# Patient Record
Sex: FEMALE | Race: WHITE
[De-identification: names, ages, dates, MRNs, and addresses within clinical notes are randomized per-mention and may not be internally consistent; named-entity substitution may affect disease eponyms.]

---

## 2017-04-03 ENCOUNTER — HOSPITAL ENCOUNTER (INPATIENT)
Dept: HOSPITAL 36 - ER | Age: 72
LOS: 3 days | Discharge: NURSING, REHAB OR LONG-TERM CARE | DRG: 872 | End: 2017-04-06
Attending: INTERNAL MEDICINE | Admitting: INTERNAL MEDICINE
Payer: MEDICARE

## 2017-04-03 DIAGNOSIS — A41.9: Primary | ICD-10-CM

## 2017-04-03 DIAGNOSIS — R62.7: ICD-10-CM

## 2017-04-03 DIAGNOSIS — F33.1: ICD-10-CM

## 2017-04-03 DIAGNOSIS — F29: ICD-10-CM

## 2017-04-03 DIAGNOSIS — J44.0: ICD-10-CM

## 2017-04-03 DIAGNOSIS — M81.0: ICD-10-CM

## 2017-04-03 DIAGNOSIS — G30.9: ICD-10-CM

## 2017-04-03 DIAGNOSIS — J45.901: ICD-10-CM

## 2017-04-03 DIAGNOSIS — M19.90: ICD-10-CM

## 2017-04-03 DIAGNOSIS — F02.80: ICD-10-CM

## 2017-04-03 DIAGNOSIS — J20.9: ICD-10-CM

## 2017-04-03 DIAGNOSIS — J44.1: ICD-10-CM

## 2017-04-03 DIAGNOSIS — E87.1: ICD-10-CM

## 2017-04-03 DIAGNOSIS — E66.9: ICD-10-CM

## 2017-04-03 LAB
ALBUMIN/GLOB SERPL: 1.3 {RATIO} (ref 1–1.8)
ALP SERPL-CCNC: 95 U/L (ref 34–104)
ALT SERPL-CCNC: 12 U/L (ref 7–52)
ANION GAP SERPL CALC-SCNC: 7.9 MMOL/L (ref 7–16)
AST SERPL-CCNC: 21 U/L (ref 13–39)
BACTERIA #/AREA URNS HPF: (no result) /HPF
BASOPHILS NFR BLD AUTO: 0.3 % (ref 0–2)
BILIRUB SERPL-MCNC: 0.3 MG/DL (ref 0.3–1)
BILIRUB UR-MCNC: NEGATIVE MG/DL
BUN SERPL-MCNC: 17 MG/DL (ref 7–25)
BUN/CREAT SERPL: 21.3
CALCIUM SERPL-MCNC: 10.3 MG/DL (ref 8.6–10.3)
CHLORIDE SERPL-SCNC: 103 MEQ/L (ref 98–107)
CO2 SERPL-SCNC: 29.2 MEQ/L (ref 21–31)
COLOR UR: YELLOW
CREAT SERPL-MCNC: 0.8 MG/DL (ref 0.6–1.2)
EOSINOPHIL NFR BLD AUTO: 3.4 % (ref 0–5)
EPI CELLS URNS QL MICRO: (no result) /LPF
ERYTHROCYTE [DISTWIDTH] IN BLOOD BY AUTOMATED COUNT: 14.5 % (ref 11.5–20)
GLOBULIN SER-MCNC: 2.9 GM/DL
GLUCOSE SERPL-MCNC: 139 MG/DL (ref 70–105)
GLUCOSE UR STRIP-MCNC: NEGATIVE MG/DL
HCT VFR BLD CALC: 41.7 % (ref 35–45)
HGB BLD-MCNC: 14 GM/DL (ref 11.7–16.1)
INR PPP: 0.92 (ref 0.5–1.4)
KETONES UR STRIP-MCNC: NEGATIVE MG/DL
LYMPHOCYTES NFR BLD AUTO: 23.5 % (ref 20–50)
MCH RBC QN AUTO: 28.9 PG (ref 27–31)
MCHC RBC AUTO-ENTMCNC: 33.6 PG (ref 28–36)
MCV RBC AUTO: 86.1 FL (ref 81–100)
MONOCYTES NFR BLD AUTO: 7.7 % (ref 2–10)
NEUTROPHILS # BLD: 4.3 TH/CMM (ref 1.8–8)
NEUTROPHILS NFR BLD AUTO: 65.1 % (ref 40–80)
PH UR STRIP: 7.5 [PH]
PLATELET # BLD: 274 TH/CMM (ref 150–400)
PMV BLD AUTO: 7.4 FL
POTASSIUM SERPL-SCNC: 4.1 MEQ/L (ref 3.5–5.1)
PROT UR STRIP-MCNC: NEGATIVE MG/DL
PROTHROMBIN TIME: 9.6 SECONDS (ref 9.5–11.5)
RBC # BLD AUTO: 4.85 MIL/CMM (ref 3.8–5.2)
RBC # UR STRIP: NEGATIVE /UL
RBC #/AREA URNS HPF: (no result) /HPF (ref 0–5)
SODIUM SERPL-SCNC: 136 MEQ/L (ref 136–145)
UROBILINOGEN UR STRIP-ACNC: 0.2 E.U./DL (ref 0.2–1)
WBC # BLD AUTO: 6.5 TH/CMM (ref 4.8–10.8)
WBC #/AREA URNS HPF: (no result) /HPF (ref 0–5)

## 2017-04-03 PROCEDURE — Z7610: HCPCS

## 2017-04-03 RX ADMIN — ALBUTEROL SULFATE SCH MG: 2.5 SOLUTION RESPIRATORY (INHALATION) at 21:16

## 2017-04-03 RX ADMIN — METHYLPREDNISOLONE SODIUM SUCCINATE SCH MG: 40 INJECTION, POWDER, FOR SOLUTION INTRAMUSCULAR; INTRAVENOUS at 21:00

## 2017-04-03 RX ADMIN — DEXTROSE AND SODIUM CHLORIDE SCH MLS/HR: 5; .45 INJECTION, SOLUTION INTRAVENOUS at 21:26

## 2017-04-03 RX ADMIN — IPRATROPIUM BROMIDE SCH MG: 0.5 SOLUTION RESPIRATORY (INHALATION) at 21:16

## 2017-04-03 NOTE — DIAGNOSTIC IMAGING REPORT
CHEST X-RAY: AP view



INDICATION: Shortness of breath, wheezing



COMPARISON: None



FINDINGS: Exam is limited as patient's head obscures the upper

hemithorax. No focal consolidation or pleural effusions. Heart size

normal. Atherosclerosis is noted. Spinal scoliosis is noted with

degenerative changes of the spine noted.



IMPRESSION:



Limited exam as patient's head obscures the upper hemithorax. Otherwise

no focal consolidation identified.



Atherosclerotic vascular disease. 



Degenerative changes of the spine with scoliosis.

## 2017-04-03 NOTE — ADMIT CRITERIA FORM
Admit Criteria Forms





- Admit Criteria


Diagnosis: 





                                             ASTHMA





Clinical Indications for Admission to Inpatient Care





                                                          (Place 'X' for any 

and all applicable criteria):





Admission is indicated for ANY ONE of the following  (1)(2)(3)(4)(5):


[ ]I.        Absent or markedly diminished breath sounds (silent chest)


[ ]II.       Oxygen saturation < 92%                                   


[ ]III.      PaCO2 = / > 42 mm Hg (5.6 kPa)


[ ]IV.     Peak expiratory flow rate < 40% of predicted or personal best after 

treatment.


[ ]V.      Peak expiratory flow rate < 33% of predicted or personal before 

after treatment


[ ]VI.     Change in mental status


[ ]VII.     Ventilatory support required  


[ ]VIII.    PaO2 < 60 mm Hg (8.0 kPa)                              


[ ]IX.      Cyanosis                                                           

  


[ ]X.       Cardiac dysrhythmia (e.g., bradycardia) 


[ ]XI.      Hemodynamic instability


[ ]XII.     Radiographic evidence of complication requiring inpatient treatment 

(e.g., pneumonia, pneumothorax)


[X]XIII.    Inpatient admission required rather than observation care (also use 

Asthma: Observation Care guideline as appropriate) 


            because of ANY ONE of the following:


            [X]a)    Respiratory finding that is severe or persistent (eg, 

dyspnea, tachypnea, accessory muscle use)


            [ ]b)    Airflow measurements less than 60% of predicted or 

personal best that persist (e.g., over 24 hours) or worsen despite treatments


            [ ]c)    Supplemental oxygen or respiratory treatments for over 24 

hours that are performable only in acute inpatient setting


            [ ]d)    Other condition, treatment or monitoring requiring 

inpatient admission.











Extended stay beyond goal length of stay may be needed for (26)(27)(28):


[ ]a)   Severe respiratory failure (23) (29) (30)           


[ ]b)   Secondary causes and complications (25)


[ ]c)   Status asthmaticus         


[ ]d)   Chronic obstructive asthma       


[ ]e)   Older patients (29)            


[ ]f)    Slow resolution                 


[ ]g)   Clinically significant exacerbation of comorbidities (eg, osvaldo. heart 

failure, atrial fibrillation)











The original [facility] content created by [facility] [employer] has been revised. 


The portions of the content which have been revised are identified through the 

use of italic text or in bold, and [facility] [employer] [employer] 


has neither reviewed nor approved the modified material. All other unmodified 

content is copyright  [facility]





Please see references footnoted in the original [facility] edition 

2016








Admit Criteria Met?: Yes

## 2017-04-03 NOTE — ED PHYSICIAN CHART
Chief Complaint/HPI





- Patient Information


Date Seen:: 04/03/17


Time Seen:: 14:03


Chief Complaint:: wheezing


History of Present Illness:: 





71-year-old female with history of dementia, sent in from skilled nursing with 

acute, moderate, wheezing since this morning.  Has associated increased work of 

breathing and decreased oral intake.





**History limited patient has underlying dementia cannot provide history


**History provided by EMS and EMS run sheet


Allergies:: 


 Allergies











Allergy/AdvReac Type Severity Reaction Status Date / Time


 


No Known Allergies Allergy   Verified 12/27/16 19:37











Historian:: Patient


Review:: Nurse's Note Reviewed





Review of Systems





- Review of Systems


Other: Complete system review otherwise unremarkable except as noted in history 

of present illness.





Past Medical History





- Past Medical History


Past Medical History: Asthma/COPD, Dementia


Family History: None


Social History: Non Smoker, No Alcohol, No Drug Use, Care Facility


Surgical History: None


Psychiatricy History: None


Medication: Reviewed





Family Medical History





- Family Member


  ** Mother


History Unknown: Yes





Physical Exam





- Physical Examination


Other:: 





INITIAL VITAL SIGNS: Reviewed by me


GENERAL: Alert and interactive.  Slight respiratory distress


HEAD: Head is normocephalic and atraumatic


EYES: EOMI. PERRL. No scleral icterus. No conjunctival injection


ENT: Moist mucous membranes. 


NECK: Supple. No masses. Full range of motion


RESPIRATORY: Slightly tachypneic with bilateral expiratory wheezing.


CV: Regular rate and rhythm. No murmurs, rubs, or gallops


ABDOMEN: Soft, non-distended, non-tender. No guarding. No rebound. No masses. 


EXTREMITIES: No deformity. No cyanosis. No edema. 


SKIN: Warm and dry. No obvious rashes. 


NEUROLOGIC: Alert and oriented. Face is symmetric. Speech is normal. Moves all 

extremities equally. Motor and sensory distally intact. 





ED Septic Shock





- .


Is Septic Shock (SBP<90, OR Lactate>4 mmol\L) present?: No





Reassessment (Disposition)





- Reassessment


Reassessment:: 





Patient having acute asthma exacerbation.  Gave IV Solu-Medrol and breathing 

treatment.  Covered with the prophylactic antibiotics.  Also has failure to 

thrive.  Underlying dementia and makes it difficult to assess the patient 

completely.  Discussed case with admitting physician who will admit the patient 

for further workup and treatment.


Reassessment Condition:: Improved





- Diagnosis


Diagnosis:: 





Acute wheezing and shortness of breath due to asthma exacerbation


Acute failure to thrive





- Patient Disposition


Discharge/Transfer:: Acute Care w/in this hosp


Admitted to:: Med/Surg


Admitting Medical Physician:: Harshad Ugalde


Time:: 15:51


Condition at Disposition:: Stable

## 2017-04-04 RX ADMIN — METHYLPREDNISOLONE SODIUM SUCCINATE SCH MG: 40 INJECTION, POWDER, FOR SOLUTION INTRAMUSCULAR; INTRAVENOUS at 07:55

## 2017-04-04 RX ADMIN — ALBUTEROL SULFATE SCH MG: 2.5 SOLUTION RESPIRATORY (INHALATION) at 15:29

## 2017-04-04 RX ADMIN — DEXTROSE AND SODIUM CHLORIDE SCH MLS/HR: 5; .45 INJECTION, SOLUTION INTRAVENOUS at 09:33

## 2017-04-04 RX ADMIN — Medication SCH TAB: at 09:33

## 2017-04-04 RX ADMIN — IPRATROPIUM BROMIDE SCH MG: 0.5 SOLUTION RESPIRATORY (INHALATION) at 19:18

## 2017-04-04 RX ADMIN — ALBUTEROL SULFATE SCH MG: 2.5 SOLUTION RESPIRATORY (INHALATION) at 19:18

## 2017-04-04 RX ADMIN — IPRATROPIUM BROMIDE SCH MG: 0.5 SOLUTION RESPIRATORY (INHALATION) at 08:35

## 2017-04-04 RX ADMIN — IPRATROPIUM BROMIDE SCH MG: 0.5 SOLUTION RESPIRATORY (INHALATION) at 15:29

## 2017-04-04 RX ADMIN — ALBUTEROL SULFATE SCH MG: 2.5 SOLUTION RESPIRATORY (INHALATION) at 11:55

## 2017-04-04 RX ADMIN — IPRATROPIUM BROMIDE SCH MG: 0.5 SOLUTION RESPIRATORY (INHALATION) at 11:55

## 2017-04-04 RX ADMIN — ALBUTEROL SULFATE SCH MG: 2.5 SOLUTION RESPIRATORY (INHALATION) at 08:35

## 2017-04-04 NOTE — HISTORY & PHYSICAL
CHIEF COMPLAINT:  Wheezing and shortness of breath.



HISTORY OF PRESENT ILLNESS:  This is a 71-year-old  female with history

of osteoarthritis, osteoporosis, bilateral hearing deficit was admitted from

nursing facility secondary to wheezing for 1 day and increasing shortness of

breath, using her ____ muscle.  The patient was seen through the Emergency Room.



PAST MEDICAL HISTORY:  As mentioned in history present illness.



PAST SURGICAL HISTORY:  Denies surgeries in the past.



ALLERGIES:  No known drug allergies.



MEDICATIONS:  The patient is on Tylenol, ____, albuterol, Namenda, meclizine,

Zofran.



FAMILY HISTORY:  Noncontributory.



SOCIAL HISTORY:  Nonsmoker, nondrinker.  The patient lives in nursing home.  The

patient has 2 daughters.



REVIEW OF SYSTEMS:

GENERAL:  Complains of not feeling well.

HEENT:  No blurred vision.

LUNGS:  ____ COPD.  Chest x-ray was suggestive, history of asthma.

HEART:  No hypertension or coronary artery disease.

ABDOMEN:  No nausea, vomiting, abdominal pain.

GENITOURINARY:  The patient denies increased dysuria.

NEUROLOGIC:  No headaches or seizure.

PSYCHIATRIC:  As stated above.



PHYSICAL EXAMINATION:

VITAL SIGNS:  Blood pressure 120/76, respirations 18, pulse 78, ____.

GENERAL:  This is an elderly female, appears stated age, mildly obese.

NECK:  Supple.  No mass.

LUNGS:  Equal breath sounds, otherwise clear to auscultation.

HEART:  Regular rate and rhythm with systolic ejection murmur.

ABDOMEN:  Soft, nontender.

EXTREMITIES:  Positive excoriations.

NEUROLOGIC:  Limited.



LABORATORY DATA:  WBC was 6.5, hemoglobin 14, platelets 274.  INR 0.9.  Sodium

136, potassium ____, ____, creatinine 0.7, blood sugar 139.  UA is negative.



ASSESSMENT AND PLAN:  Acute asthma exacerbation, acute chronic obstructive

pulmonary disease exacerbation, acute bronchitis, bilateral hearing deficit,

osteoarthritis, osteoporosis, dementia, obesity.  We will continue the patient

on IV antibiotics and also continue oxygen and bronchodilator treatment.  I will

review chest x-ray.  We will send blood cultures as well as sputum ____ and C

and S.  We will continue and monitor the patient closely.





DD: 04/04/2017 15:29

DT: 04/04/2017 17:02

JOB# [id number]  [id number]

## 2017-04-04 NOTE — DIAGNOSTIC IMAGING REPORT
CHEST X-RAY: AP view



INDICATION: Shortness of breath



COMPARISON: Chest x-ray 4/3/2017



FINDINGS: Chronic lung changes are seen with no focal consolidation or

effusions. Heart size is normal. Tortuous senescent aorta is noted with

atherosclerosis. Degenerative changes of the spine are noted.



IMPRESSION:



Chronic lung changes with possible COPD. No focal consolidation

identified.



Tortuous senescent aorta with atherosclerotic vascular disease.

## 2017-04-05 LAB
AMMONIA BLD-SCNC: 37 UMOL/L (ref 16–53)
ANION GAP SERPL CALC-SCNC: 6.4 MMOL/L (ref 7–16)
BUN SERPL-MCNC: 25 MG/DL (ref 7–25)
BUN/CREAT SERPL: 41.7
CALCIUM SERPL-MCNC: 10.3 MG/DL (ref 8.6–10.3)
CHLORIDE SERPL-SCNC: 103 MEQ/L (ref 98–107)
CO2 SERPL-SCNC: 27 MEQ/L (ref 21–31)
CREAT SERPL-MCNC: 0.6 MG/DL (ref 0.6–1.2)
ERYTHROCYTE [DISTWIDTH] IN BLOOD BY AUTOMATED COUNT: 14.6 % (ref 11.5–20)
GLUCOSE SERPL-MCNC: 164 MG/DL (ref 70–105)
HCT VFR BLD CALC: 37.3 % (ref 35–45)
HGB BLD-MCNC: 13.1 GM/DL (ref 11.7–16.1)
MAGNESIUM SERPL-MCNC: 1.7 MG/DL (ref 1.9–2.7)
MCH RBC QN AUTO: 29.8 PG (ref 27–31)
MCHC RBC AUTO-ENTMCNC: 35.2 PG (ref 28–36)
MCV RBC AUTO: 84.6 FL (ref 81–100)
NEUTROPHILS NFR BLD AUTO: 92 % (ref 40–80)
NEUTS BAND NFR BLD: 4 % (ref 0–10)
PLAT MORPH BLD: NORMAL
PLATELET # BLD EST: ADEQUATE 10*3/UL
PLATELET # BLD: 261 TH/CMM (ref 150–400)
PMV BLD AUTO: 7.8 FL
POTASSIUM SERPL-SCNC: 4.4 MEQ/L (ref 3.5–5.1)
RBC # BLD AUTO: 4.41 MIL/CMM (ref 3.8–5.2)
RBC MORPH BLD: NORMAL
SODIUM SERPL-SCNC: 132 MEQ/L (ref 136–145)
TOTAL CELLS COUNTED BLD: 100
TSH SERPL-ACNC: 0.12 UIU/ML (ref 0.34–5.6)
WBC # BLD AUTO: 16.9 TH/CMM (ref 4.8–10.8)

## 2017-04-05 RX ADMIN — ALBUTEROL SULFATE SCH MG: 2.5 SOLUTION RESPIRATORY (INHALATION) at 09:11

## 2017-04-05 RX ADMIN — IPRATROPIUM BROMIDE SCH MG: 0.5 SOLUTION RESPIRATORY (INHALATION) at 14:47

## 2017-04-05 RX ADMIN — DEXTROSE AND SODIUM CHLORIDE SCH MLS/HR: 5; .45 INJECTION, SOLUTION INTRAVENOUS at 21:56

## 2017-04-05 RX ADMIN — Medication SCH TAB: at 08:31

## 2017-04-05 RX ADMIN — IPRATROPIUM BROMIDE SCH MG: 0.5 SOLUTION RESPIRATORY (INHALATION) at 19:48

## 2017-04-05 RX ADMIN — DEXTROSE AND SODIUM CHLORIDE SCH MLS/HR: 5; .45 INJECTION, SOLUTION INTRAVENOUS at 06:44

## 2017-04-05 RX ADMIN — ALBUTEROL SULFATE SCH MG: 2.5 SOLUTION RESPIRATORY (INHALATION) at 19:48

## 2017-04-05 RX ADMIN — IPRATROPIUM BROMIDE SCH MG: 0.5 SOLUTION RESPIRATORY (INHALATION) at 09:12

## 2017-04-05 RX ADMIN — ALBUTEROL SULFATE SCH MG: 2.5 SOLUTION RESPIRATORY (INHALATION) at 11:59

## 2017-04-05 RX ADMIN — ALBUTEROL SULFATE SCH MG: 2.5 SOLUTION RESPIRATORY (INHALATION) at 14:47

## 2017-04-05 RX ADMIN — IPRATROPIUM BROMIDE SCH MG: 0.5 SOLUTION RESPIRATORY (INHALATION) at 11:59

## 2017-04-05 NOTE — DIAGNOSTIC IMAGING REPORT
Portable chest x-ray



HISTORY: Pneumonia



Compared with prior exam of April 4, 2017, no focal pulmonary processes

are seen. Atherosclerotic calcification seen in the aorta. No hilar or

mediastinal abnormalities. Scoliosis and degenerative changes noted to

the spine.



IMPRESSION:

1. No acute focal pulmonary processes

2. Atherosclerotic vascular changes

## 2017-04-05 NOTE — INTERNAL MEDICINE PROG NOTE
Internal Medicine Subjective





- Subjective


Patient seen and examined:: with staff, chart reviewed


Patient is:: awake, verbal, interactive


Patient Complaints of:: congestion


Per staff patient is:: no episodes of fall, eating well





Internal Medicine Objective





- Results


Result Diagrams: 


 04/05/17 06:20





 04/05/17 06:20


Recent Labs: 


 Laboratory Last Values











WBC  16.9 Th/cmm (4.8-10.8)  H D 04/05/17  06:20    


 


RBC  4.41 Mil/cmm (3.80-5.20)   04/05/17  06:20    


 


Hgb  13.1 gm/dL (11.7-16.1)   04/05/17  06:20    


 


Hct  37.3 % (35.0-45.0)  D 04/05/17  06:20    


 


MCV  84.6 fl ()   04/05/17  06:20    


 


MCH  29.8 pg (27.0-31.0)   04/05/17  06:20    


 


MCHC Differential  35.2 pg (28.0-36.0)   04/05/17  06:20    


 


RDW  14.6 % (11.5-20.0)   04/05/17  06:20    


 


Plt Count  261 Th/cmm (150-400)   04/05/17  06:20    


 


MPV  7.8 fl  04/05/17  06:20    


 


Neutrophils %  65.1 % (40.0-80.0)   04/03/17  14:33    


 


Band Neutrophils %  4 % (0-10)   04/05/17  06:20    


 


Lymphocytes %  23.5 % (20.0-50.0)   04/03/17  14:33    


 


Monocytes %  7.7 % (2.0-10.0)   04/03/17  14:33    


 


Eosinophils %  3.4 % (0.0-5.0)   04/03/17  14:33    


 


Basophils %  0.3 % (0.0-2.0)   04/03/17  14:33    


 


Neutrophils (Manual)  92 % (40-80)  H  04/05/17  06:20    


 


Lymphocytes  3 % (20-50)  L  04/05/17  06:20    


 


Monocytes  1 % (2-10)  L  04/05/17  06:20    


 


Platelet Estimate  ADEQUATE  (NORMAL)   04/05/17  06:20    


 


Platelet Morphology  NORMAL  (NORMAL)   04/05/17  06:20    


 


RBC Morph Micro Appear  NORMAL  (NORMAL)   04/05/17  06:20    


 


PT  9.6 SECONDS (9.5-11.5)   04/03/17  14:33    


 


INR  0.92  (0.5-1.4)   04/03/17  14:33    


 


PTT (Actin FS)  20.7 SECONDS (26.0-38.0)  L  04/03/17  14:33    


 


Sodium  132 mEq/L (136-145)  L  04/05/17  06:20    


 


Potassium  4.4 mEq/L (3.5-5.1)   04/05/17  06:20    


 


Chloride  103 mEq/L ()   04/05/17  06:20    


 


Carbon Dioxide  27.0 mEq/L (21.0-31.0)   04/05/17  06:20    


 


Anion Gap  6.4  (7.0-16.0)  L  04/05/17  06:20    


 


BUN  25 mg/dL (7-25)   04/05/17  06:20    


 


Creatinine  0.6 mg/dL (0.6-1.2)   04/05/17  06:20    


 


Est GFR ( Amer)  TNP   04/05/17  06:20    


 


Est GFR (Non-Af Amer)  TNP   04/05/17  06:20    


 


BUN/Creatinine Ratio  41.7   04/05/17  06:20    


 


Glucose  164 mg/dL ()  H  04/05/17  06:20    


 


Whole Bld Lactic Acid  1.87 mmol/L (0.60-1.99)   04/03/17  14:33    


 


Calcium  10.3 mg/dL (8.6-10.3)   04/05/17  06:20    


 


Magnesium  1.7 mg/dL (1.9-2.7)  L  04/05/17  06:20    


 


Total Bilirubin  0.3 mg/dL (0.3-1.0)   04/03/17  14:33    


 


AST  21 U/L (13-39)   04/03/17  14:33    


 


ALT  12 U/L (7-52)   04/03/17  14:33    


 


Alkaline Phosphatase  95 U/L ()   04/03/17  14:33    


 


Ammonia  37 umol/L (16-53)   04/05/17  06:20    


 


Creatine Kinase  27 U/L ()  L  04/03/17  14:33    


 


B-Natriuretic Peptide  152.0 pg/mL (5.0-100.0)  H  04/05/17  06:20    


 


Total Protein  6.8 gm/dL (6.0-8.3)   04/03/17  14:33    


 


Albumin  3.9 gm/dL (3.7-5.3)   04/03/17  14:33    


 


Globulin  2.9 gm/dL  04/03/17  14:33    


 


Albumin/Globulin Ratio  1.3  (1.0-1.8)   04/03/17  14:33    


 


TSH  0.12 uIU/ml (0.34-5.60)  L  04/05/17  06:20    


 


Urine Source  CATH   04/03/17  14:25    


 


Urine Color  YELLOW   04/03/17  14:25    


 


Urine Clarity  CLEAR  (CLEAR)   04/03/17  14:25    


 


Urine pH  7.5   04/03/17  14:25    


 


Ur Specific Gravity  1.015  (1.005-1.030)   04/03/17  14:25    


 


Urine Protein  NEGATIVE mg/dL (NEGATIVE)   04/03/17  14:25    


 


Urine Glucose (UA)  NEGATIVE mg/dL (NEGATIVE)   04/03/17  14:25    


 


Urine Ketones  NEGATIVE mg/dL (NEGATIVE)   04/03/17  14:25    


 


Urine Blood  NEGATIVE  (NEGATIVE)   04/03/17  14:25    


 


Urine Nitrate  NEGATIVE  (NEGATIVE)   04/03/17  14:25    


 


Urine Bilirubin  NEGATIVE  (NEGATIVE)   04/03/17  14:25    


 


Urine Urobilinogen  0.2 E.U./dL (0.2 - 1.0)   04/03/17  14:25    


 


Ur Leukocyte Esterase  NEGATIVE  (NEGATIVE)   04/03/17  14:25    


 


Urine RBC  NONE SEEN /hpf (0-5)   04/03/17  14:25    


 


Urine WBC  0-2 /hpf (0-5)   04/03/17  14:25    


 


Ur Epithelial Cells  RARE /lpf (FEW)   04/03/17  14:25    


 


Urine Bacteria  FEW /hpf (NONE SEEN)   04/03/17  14:25    














- Physical Exam


Vitals and I&O: 


 Vital Signs











Temp  97.1 F   04/05/17 12:17


 


Pulse  73   04/05/17 14:47


 


Resp  18   04/05/17 14:47


 


BP  117/71   04/05/17 12:17


 


Pulse Ox  94   04/05/17 14:47








 Intake & Output











 04/04/17 04/05/17 04/05/17





 18:59 06:59 18:59


 


Intake Total 958.75 1150 


 


Balance 958.75 1150 


 


Intake:   


 


  Intake, IV Amount 958.75 1050 


 


    Cefepime 1 gm In Dextrose 50 50 





    5% 50 ml @ 100 mls/hr IV   





    Q12H [facility] Rx#:[id number]   


 


    D5-0.45NS 1,000 ml @ 75 908.75 1000 





    mls/hr IV .[id number] [facility] Rx   





    #:[id number]   


 


  Oral  100 


 


Other:   


 


  Stool Characteristics  Soft Soft











Active Medications: 


Current Medications





Acetaminophen (Tylenol)  650 mg PO Q4HR PRN


   PRN Reason: Pain or Fever >101


   Stop: 06/02/17 19:27


Al Hydrox/Mg Hydrox/Simethicone (Maalox)  30 ml PO Q4H PRN


   PRN Reason: GI DISTRESS


Albuterol Sulfate (Albuterol 2.5mg/3ml Neb Ud)  2.5 mg HHN QIDRT [facility]


   Stop: 06/03/17 10:59


   Last Admin: 04/05/17 14:47 Dose:  2.5 mg


Citalopram Hydrobromide (Celexa)  20 mg PO DAILY RAPHAEL


   PRN Reason: Protocol


   Stop: 06/03/17 08:59


   Last Admin: 04/05/17 08:31 Dose:  20 mg


Donepezil HCl (Aricept)  5 mg PO HS [facility]


   Stop: 06/02/17 20:59


   Last Admin: 04/04/17 21:26 Dose:  5 mg


Guaifenesin (Robitussin)  200 mg PO Q4HR PRN


   PRN Reason: Cough or Congestion


   Stop: 06/02/17 19:27


Heparin Sodium (Porcine) (Heparin)  5,000 units SUBQ Q12HR [facility]


   Stop: 06/02/17 20:59


   Last Admin: 04/05/17 08:30 Dose:  5,000 units


Cefepime HCl 1 gm/ Dextrose  50 mls @ 100 mls/hr IV Q12H [facility]


   Stop: 06/02/17 19:29


   Last Admin: 04/05/17 06:43 Dose:  100 mls/hr


Dextrose/Sodium Chloride (D5-0.45ns)  1,000 mls @ 75 mls/hr IV .[id number] [facility]


   Stop: 06/02/17 19:29


   Last Admin: 04/05/17 06:44 Dose:  75 mls/hr


Ipratropium Bromide (Atrovent Neb 0.5mg/2.5ml)  0.5 mg HHN QIDRT [facility]


   Stop: 06/03/17 10:59


   Last Admin: 04/05/17 14:47 Dose:  0.5 mg


Magnesium Hydroxide (Milk Of Magnesia)  30 ml PO DAILY PRN


   PRN Reason: Constipation


   Stop: 06/02/17 19:26


Meclizine HCl (Antivert)  25 mg PO DAILY PRN


   PRN Reason: Nausea / Vomiting


   Stop: 06/02/17 19:27


Memantine (Namenda)  5 mg PO DAILY [facility]


   Stop: 06/03/17 08:59


   Last Admin: 04/05/17 08:31 Dose:  5 mg


Ondansetron HCl (Zofran)  4 mg IV Q8H PRN


   PRN Reason: Nausea / Vomiting


   Stop: 06/02/17 19:27








General: alert


HEENT: NC/AT, PERRLA


Neck: Supple, No JVD


Lungs: congested, rales


Cardiovascular: RRR, Normal S1, Normal S2


Abdomen: soft non-tender, globular


Extremities: excoriation


Neurological: no change





Internal Medicine Assmt/Plan





- Assessment


Assessment: 





acute asthma exac 


acute bronchitis 


dementia 


obesity 


leukocytosis


hyponatremia





- Plan


Plan: 





cont on iv abx 


monitor wbc


o2 


bronchodilator 


dw rn 





Nutritional Asmnt/Malnutr-PDOC





- Dietary Evaluation


Malnutrition Findings (Please click <Entered> for more info): 








Nutritional Asmnt/Malnutrition                             Start:  04/04/17 13:

03


Text:                                                      Status: Complete    

  


Freq:                                                                          

  


 Document     04/04/17 13:03  GSUN  (Rec: 04/04/17 13:30  GSUN  [id number])


 Nutritional Asmnt/Malnutrition


     Patient General Information


      Nutritional Screening                      High Risk Screening


      Diagnosis                                  ER: acute asthma exacerbation,


                                                 acute failrue to thrive


      Pertinent Medical Hx/Surgical Hx           ER: dementia, asthma/COPD


      Subjective Information                     71year old female from SNF.


                                                 Visited pt with daughter at


                                                 bedside. Pt was able to answer


                                                 simple questiosn with


                                                 forgetfulness noted. Per ER


                                                 notes, pt dx FTT and noted


                                                 with decreased oral intake.


                                                 .7lb. Daughter reported


                                                 pt usually with good appetite


                                                 , possibly even gained weight


                                                 since adm to SNF, unsure of


                                                 CBW, aware that pt admitted


                                                 due to decreased oral. Re-


                                                 visited pt during lunch time,


                                                 observed more than half meal


                                                 untouched, asked pt, pt stated


                                                 "I'm done."


      Current Diet Order/ Nutrition Support      [facility] soft ground


      Pertinent Medications                      Maalox, D5, MOM, Solu-Medrol,


                                                 Zofran


      Pertinent Labs                             4/3: glucose 139H


     Nutritional Hx/Data


      Height                                     1.68 m


      Height (Calculated Centimeters)            167.6


      Current Weight (lbs)                       65.181 kg


      Weight (Calculated Kilograms)              65.2


      Weight (Calculated Grams)                  04896.2


      Ideal Body Weight                          130


      Weight Status                              Approriate


     GI Symptoms


      Usual diet at home                         Luis Montanez: [facility] soft diet


      Skin Integrity/Comment:                    Henry 15. Skin intact.


      Current %PO                                Poor (25-49%)


     Estimated Nutritional Goals


      BEE in Kcals:                              Using Current wt


      Calories/Kcals/Kg                          .7lb/65.3kg


      Kcals Calculated                           1959kcal (30kcal/kg)


      Protein:                                   Using Current wt


      Protein Calculated                         65-78g (1-1.2g/kg)


      Fluid: ml                                  1959ml (1ml/kcal)


     Nutritional Problem


      1. Problem


       Problem                                   Inadequate oral food beverage


                                                 intake related to


       Etiology                                  possibly cognition, possibly


                                                 lost of appetite, ER dx. FTT


                                                 aeb


       Signs/Symptoms:                           ER noted decreased PO at SNF,


                                                 observed lunch more than half


                                                 untouched


     Intervention/Recommendation


      Comments                                   1. Continue with [facility] soft


                                                 ground diet. Dx. FTT and poor


                                                 PO intake noted, will monitor.


     Expected Outcomes/Goals


      Expected Outcomes/Goals                    1. PO intake to meet at least


                                                 100% of nutritional needs, dx.


                                                 FTT.

## 2017-04-06 LAB
ERYTHROCYTE [DISTWIDTH] IN BLOOD BY AUTOMATED COUNT: 14.8 % (ref 11.5–20)
FOLATE SERPL-MCNC: 11.2 NG/ML (ref 3–?)
HCT VFR BLD CALC: 34.8 % (ref 35–45)
HGB BLD-MCNC: 11.9 GM/DL (ref 11.7–16.1)
MCH RBC QN AUTO: 29.5 PG (ref 27–31)
MCHC RBC AUTO-ENTMCNC: 34.2 PG (ref 28–36)
MCV RBC AUTO: 86.4 FL (ref 81–100)
NEUTROPHILS NFR BLD AUTO: 92 % (ref 40–80)
PLATELET # BLD EST: ADEQUATE 10*3/UL
PLATELET # BLD: 239 TH/CMM (ref 150–400)
PMV BLD AUTO: 7.1 FL
RBC # BLD AUTO: 4.03 MIL/CMM (ref 3.8–5.2)
RBC MORPH BLD: NORMAL
TOTAL CELLS COUNTED BLD: 100
VIT B12 SERPL-MCNC: 382 PG/ML (ref 211–946)
WBC # BLD AUTO: 11.9 TH/CMM (ref 4.8–10.8)

## 2017-04-06 RX ADMIN — Medication SCH TAB: at 08:46

## 2017-04-06 RX ADMIN — IPRATROPIUM BROMIDE SCH MG: 0.5 SOLUTION RESPIRATORY (INHALATION) at 15:11

## 2017-04-06 RX ADMIN — ALBUTEROL SULFATE SCH MG: 2.5 SOLUTION RESPIRATORY (INHALATION) at 10:44

## 2017-04-06 RX ADMIN — ALBUTEROL SULFATE SCH MG: 2.5 SOLUTION RESPIRATORY (INHALATION) at 15:11

## 2017-04-06 RX ADMIN — IPRATROPIUM BROMIDE SCH MG: 0.5 SOLUTION RESPIRATORY (INHALATION) at 07:05

## 2017-04-06 RX ADMIN — ALBUTEROL SULFATE SCH MG: 2.5 SOLUTION RESPIRATORY (INHALATION) at 07:05

## 2017-04-06 RX ADMIN — IPRATROPIUM BROMIDE SCH MG: 0.5 SOLUTION RESPIRATORY (INHALATION) at 10:44

## 2017-04-06 NOTE — CONSULTATION
The patient was seen, chart reviewed, and discussed with staff.



HISTORY OF PRESENT ILLNESS:  The patient is a 71-year-old female with a history

of depression with psychosis and dementia, Alzheimer's type, known to myself for

the past several years, currently admitted to the medical floor with underlying

infection with elevated white count of 16.9.  The  patient was able to recognize

me when I walked in the room today.  She said she is feeling tired and still

having some episodes where she feels anxious and depressed.  The patient has

been cooperative with staff.  The patient has not been agitated.  The patient is

taking her medications.



PAST PSYCHIATRIC HISTORY:  Multiple hospitalizations, chronic history of

depression.



PAST MEDICAL HISTORY:  As per Dr. Ugalde.



PSYCHOSOCIAL HISTORY:  The patient resides in [facility], and she requires

complete care.  Her daughter is usually involved and supportive.



MENTAL STATUS EXAMINATION:  The patient is in bed, oriented to person, knew she

was in the hospital, and she is oriented to approximate time.  Her speech is

fluent, not pressured.  Affect appears to be somewhat anxious and depressed.  No

auditory or visual hallucinations.  No suicidal thoughts, limited insight.



ASSESSMENT:  Major depressive disorder, recurrent, moderate dementia,

Alzheimer's type.



PLAN:  We will continue Celexa 20 mg daily.  Continue supportive measures. 

Increase Aricept to 10 mg p.o. at bedtime.  Use Ativan 0.5 mg p.o. b.i.d. p.r.n.

anxiety.  We will follow the patient while in the hospital.



Thank you for the consultation.





DD: 04/05/2017 15:47

DT: 04/06/2017 04:56

[facility]# [id number]  [id number]

## 2017-04-07 NOTE — PROGRESS NOTES
The patient was seen, discussed with staff, chart is reviewed.  Still forgetful

and confused though she was in hospital, thought she was 89 years old, the

patient is slightly guarded, her Zyprexa was held upon admission to minimize any

sedation.  The patient is still with sad facial expression and depressive

symptoms.  We will increase Namenda to 5 mg p.o. b.i.d.  We will continue Celexa

20 mg daily, restart Zyprexa at a smaller dose 2.5 mg p.o. at bedtime.  The

patient appears to be somewhat more paranoid and guarded.





DD: 04/06/2017 12:42

DT: 04/07/2017 04:12

[facility]# [id number]  [id number]

## 2017-06-11 NOTE — DISCHARGE SUMMARY
DATE OF DISCHARGE:  04/06/2017



CHIEF COMPLAINT:  Wheezing and shortness of breath.



FINAL DIAGNOSES:  Acute asthma exacerbation, acute chronic obstructive pulmonary

disease exacerbation, acute bronchitis ____ hearing deficit, osteoarthritis,

osteoporosis, dementia and obesity.



HISTORYOF PRESENT ILLNESS:  This is a 72-year-old  female with history

of osteoarthritis, osteoporosis, hearing deficit who was admitted from nursing

facility secondary to increased shortness of breath.  The patient was seen in

the Emergency Room and admitted for further management.



PHYSICAL EXAMINATION:

VITAL SIGNS:  Blood pressure 112/64, respirations 18, pulse 80, temperature

98.6.

GENERAL:  Elderly female, appears her stated age.

NECK:  Supple.

LUNGS:  Equal breath sounds, few rhonchi.

HEART:  Regular rate and rhythm, systolic ejection murmur.

ABDOMEN:  Soft, nontender.

EXTREMITIES:  Positive excoriations.



HOSPITAL COURSE:  The patient was admitted to medical floor and continued on IV

hydration and IV antibiotic ____ IV steroids.  The patient's condition did

improve.  X-ray did improve.  The patient was also referred to Psychiatry for

further adjustment of her psychotropic medication.



CONDITION ON DISCHARGE:  Fair.



DISCHARGE INSTRUCTIONS:  The patient is to continue on oral antibiotic regimen

as well as steroids in the nursing facility.





DD: 06/11/2017 16:38

DT: 06/11/2017 21:28

JOB# [id number]  [id number]

## 2017-08-13 ENCOUNTER — HOSPITAL ENCOUNTER (EMERGENCY)
Dept: HOSPITAL 36 - ER | Age: 72
Discharge: NURSING, REHAB OR LONG-TERM CARE | End: 2017-08-13
Payer: MEDICARE
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
## 2017-08-13 DIAGNOSIS — K21.9: ICD-10-CM

## 2017-08-13 DIAGNOSIS — J45.909: ICD-10-CM

## 2017-08-13 DIAGNOSIS — S01.81XA: Primary | ICD-10-CM
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
## 2017-08-13 DIAGNOSIS — S16.1XXA: ICD-10-CM
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
## 2017-08-13 DIAGNOSIS — J44.9: ICD-10-CM
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
## 2017-08-13 LAB
ALBUMIN/GLOB SERPL: 1.6 {RATIO} (ref 1–1.8)
ALP SERPL-CCNC: 116 U/L (ref 34–104)
ALT SERPL-CCNC: 17 U/L (ref 7–52)
ANION GAP SERPL CALC-SCNC: 7.8 MMOL/L (ref 7–16)
AST SERPL-CCNC: 23 U/L (ref 13–39)
BASOPHILS NFR BLD AUTO: 0.4 % (ref 0–2)
BILIRUB SERPL-MCNC: 0.4 MG/DL (ref 0.3–1)
BUN SERPL-MCNC: 23 MG/DL (ref 7–25)
BUN/CREAT SERPL: 38.3
CALCIUM SERPL-MCNC: 10.1 MG/DL (ref 8.6–10.3)
CHLORIDE SERPL-SCNC: 104 MEQ/L (ref 98–107)
CO2 SERPL-SCNC: 29.2 MEQ/L (ref 21–31)
CREAT SERPL-MCNC: 0.6 MG/DL (ref 0.6–1.2)
EOSINOPHIL NFR BLD AUTO: 1.8 % (ref 0–5)
ERYTHROCYTE [DISTWIDTH] IN BLOOD BY AUTOMATED COUNT: 13.5 % (ref 11.5–20)
GLOBULIN SER-MCNC: 2.6 GM/DL
GLUCOSE SERPL-MCNC: 111 MG/DL (ref 70–105)
HCT VFR BLD CALC: 41.7 % (ref 35–45)
HGB BLD-MCNC: 14.1 GM/DL (ref 11.7–16.1)
INR PPP: 0.93 (ref 0.5–1.4)
LYMPHOCYTES NFR BLD AUTO: 14.1 % (ref 20–50)
MCH RBC QN AUTO: 30.2 PG (ref 27–31)
MCHC RBC AUTO-ENTMCNC: 33.8 PG (ref 28–36)
MCV RBC AUTO: 89.3 FL (ref 81–100)
MONOCYTES NFR BLD AUTO: 7.7 % (ref 2–10)
NEUTROPHILS # BLD: 7.3 TH/CMM (ref 1.8–8)
NEUTROPHILS NFR BLD AUTO: 76 % (ref 40–80)
PLATELET # BLD: 276 TH/CMM (ref 150–400)
PMV BLD AUTO: 7.2 FL
POTASSIUM SERPL-SCNC: 4 MEQ/L (ref 3.5–5.1)
PROTHROMBIN TIME: 9.7 SECONDS (ref 9.5–11.5)
RBC # BLD AUTO: 4.66 MIL/CMM (ref 3.8–5.2)
SODIUM SERPL-SCNC: 137 MEQ/L (ref 136–145)
WBC # BLD AUTO: 9.6 TH/CMM (ref 4.8–10.8)

## 2017-08-13 PROCEDURE — Z7610: HCPCS

## 2017-08-13 NOTE — ED PHYSICIAN CHART
Chief Complaint/HPI





- Patient Information


Date Seen:: 08/13/17


Time Seen:: 08:45


Chief Complaint:: L forehead laceration.


History of Present Illness:: 





Brought in by ambulance from nursing facility because pt sustained the above 

mentioned injury when she fell at about 4 am today. Pt c/o L frontal headahce 

and posterior neck pain. Pt states that she was getting up to go to the 

bathroom. She slipped and fell. No LOC. Pt denies any other bodily injury or 

pain. Pt has dementia and is not fully cooperative; thus, H & P are limited. 

Info is primarily from review of transfer documents. Last tetanus immunization 

is unknown.


Allergies:: 


 Allergies











Allergy/AdvReac Type Severity Reaction Status Date / Time


 


No Known Allergies Allergy   Verified 12/27/16 19:37











Vitals:: 





see Nurse Note.


Historian:: Patient, Medical Records (from transferring facility.)


Family MD/PCP:: 





Dr. Blue


LMP:: 





Postmenopausal.


Review:: Nurse's Note Reviewed, Transfer documents Reviewed





Review of Systems





- Review of Systems


General/Constitutional: Other (Pt does not cooperate fully for ROS.)


Head: Headache


Cardio Vascular: No chest pain


Pulmonary: No SOB


Musculoskeletal: No back pain, Other (posterior neck pain.)





Past Medical History





- Past Medical History


Past Medical History: Asthma/COPD, PUD/GERD, Arthritis, Dementia (with Alzheimer

's Disease.), Other (Osteoporosis, chronic anemia)


Family History: Other (Pt does not cooperate to provide info on FHx.)


Social History: Care Facility, Other (Pt does not cooperate to )


Surgical History: other (Pt does not cooperate to provide info on Surgical Hx.)


Psychiatricy History: Depression, Dementia


Medication: Reviewed





Family Medical History





- Family Member


  ** Mother


History Unknown: Yes





Physical Exam





- Physical Examination


General/Constitutional: Awake, Well-developed, well-nourished, Alert, No 

distress, Non-toxic appearing


Other Gen/Cons comments:: 





Breathes comfortably, speaks clearly, but is not fully cooperative.


Other Head comments:: 





There is an approx. 2.5 cm transverse superficial laceration noticed at L 

forehead. No skull deformity or bony tapering detected. No active bleeding.


Eyes: Lids, conjuctiva normal, PERRL, EOMI


Skin: No rash, No ecchymosis, Well hydrated, No lymphadenopathy


Other Skin comments:: 





see also Head exam.


ENMT: External ears, nose nl, TM canals nl, Nasal exam nl, Oropharynx nl


Other ENMT comments:: 





No hemoptympanus


Neck: No JVD, No nuchal rigidity, No mass, No stridor


Other Neck comments:: 





Mild tenderness at mid posterior aspect. Neck is supple. No gross deformity, 

erythema, ecchymosis or open wound.


Respiratory: Nl effort/Exclusion, Clear to Auscultation, No Wheeze/Rhonchi/Rales


Cardio Vascular: RRR, No murmur, gallop, rubs


GI: No tenderness/rebounding/guarding, No organomegaly, No hernia, Normal BS's, 

Nondistended, No mass/bruits


Other GI comments:: 





There is a well healed midline longitudinal surgical scar at upper abdomen 

above the umbilicus.


: No CVA tenderness


Extremities: No edema


Other Extremities comments:: 





Both hips are nontender with good ROM. No leg length discrepancy. Pt can 

medially and laterally rotate both hips without difficulty.


Neuro/Psych: Alert/oriented (knows her name and that she is in an ER.), Mood 

normal


Other Neuro/Psych comments:: 





Spontaneous movements noticed in all 4 extremities. Pt does not cooperate for 

full neurological exam.


Misc: normal gait, Normal back, No paraspinal tenderness





Labs/Radiology/EKG Results





- Lab Results


Results: 





 Laboratory Tests











  08/13/17 08/13/17 08/13/17





  09:37 09:37 09:37


 


WBC  9.6  


 


RBC  4.66  


 


Hgb  14.1  D  


 


Hct  41.7  D  


 


MCV  89.3  


 


MCH  30.2  


 


MCHC Differential  33.8  


 


RDW  13.5  


 


Plt Count  276  


 


MPV  7.2  


 


Neutrophils %  76.0  


 


Lymphocytes %  14.1 L  


 


Monocytes %  7.7  


 


Eosinophils %  1.8  


 


Basophils %  0.4  


 


PT   9.7 


 


INR   0.93 


 


PTT (Actin FS)   25.6 L 


 


Sodium    137


 


Potassium    4.0


 


Chloride    104


 


Carbon Dioxide    29.2


 


Anion Gap    7.8


 


BUN    23


 


Creatinine    0.6


 


Est GFR ( Amer)    TNP


 


Est GFR (Non-Af Amer)    TNP


 


BUN/Creatinine Ratio    38.3


 


Glucose    111 H


 


Calcium    10.1


 


Total Bilirubin    0.4


 


AST    23


 


ALT    17


 


Alkaline Phosphatase    116 H


 


Creatine Kinase    39


 


Troponin I   


 


Total Protein    6.7


 


Albumin    4.1


 


Globulin    2.6


 


Albumin/Globulin Ratio    1.6














  08/13/17





  09:37


 


WBC 


 


RBC 


 


Hgb 


 


Hct 


 


MCV 


 


MCH 


 


MCHC Differential 


 


RDW 


 


Plt Count 


 


MPV 


 


Neutrophils % 


 


Lymphocytes % 


 


Monocytes % 


 


Eosinophils % 


 


Basophils % 


 


PT 


 


INR 


 


PTT (Actin FS) 


 


Sodium 


 


Potassium 


 


Chloride 


 


Carbon Dioxide 


 


Anion Gap 


 


BUN 


 


Creatinine 


 


Est GFR ( Amer) 


 


Est GFR (Non-Af Amer) 


 


BUN/Creatinine Ratio 


 


Glucose 


 


Calcium 


 


Total Bilirubin 


 


AST 


 


ALT 


 


Alkaline Phosphatase 


 


Creatine Kinase 


 


Troponin I  0.01


 


Total Protein 


 


Albumin 


 


Globulin 


 


Albumin/Globulin Ratio 














- Radiology Results


Results: 





CT head without contrast: Soft tissue injury of the left forehead. No evidence 

of an skull fracture. No evidence of an acute intracranial hemorrhage. Mild 

atrophy. Moderate supratentorial white matter disease which is nonspecific and 

may be due to chronic microvessel ischemia. Official report per Dr. Ronaldo Tristan

, radiologist.





CT cervical spine without contrast: No evidence of an acute fracture. Extensive 

multilevel degenerative changes. 3 mm posterior translation of the left lateral 

mass of C1 in relation to C2 which was also seen on prior exam and may be due 

to chronic degenerative etiology and/or positioning. 3 mm anterolisthesis of C3 

on C4 and C7 on T1 likely due to underlying facet arthropathy. Atherosclerotic 

vascular disease. Heterogeneous thyroid gland. Ultrasound would further 

clarify. Official report per Dr. Ronaldo Tristan, radiologist.





- EKG Interpretations


EKG Time:: 09:10


Rate & Rhythm: NSR with VR 68


Comments:: 





NSSTT changes.





Repeat EKG at 1309 NSR with VR 62. NSSTT changes.





ED Septic Shock





- .


Is Septic Shock (SBP<90, OR Lactate>4 mmol\L) present?: No





Reassessment (Disposition)





- Reassessment


Reassessment:: 





1125 Pt has been repeatedly evaluated. Pt overall feels better without 

subjective complaint. Awaiting head CT report.





     Laceration repair: L forehead laceration approx. 2.5 cm in length. 


                             Pt was preped in usual sterile manner. Wound was 

cleansed with betadine, then it was well irrigated with sterile normal saline. 

Wound was explored. No foreign body was found. Wound was well approximated with 

Dermabond. Pt tolerated the procedure without immediate complication.





1320 Pt has been repeatedly evaluated. Pt remains stable. No new findings. EKG, 

lab, and CT findings have been reviewed with pt. Management plan has been 

discussed. Dr. Blue is to be contacted.





1440 Pt remains stable. No headache or neck pain. No chest pain or discomfort. 

No dyspnea. Case was discussed with Dr. Blue with pertinent H & P, EKG's, CT 

and lab findings reviewed. He decided that pt is to be transferred back to 

nursing care facility. He will resume care of pt there. Regarding the 

incidental findings on thyroid gland on cervical spine CT, he will make sure 

that pt to have outpatient work up and thyroid sonogram to be done as 

outpatient.





1448 Pt remains comfortable. Pt requests to be sent back to nursing facility 

now. Aftercare instructions have been given.


Reassessment Condition:: Improved





- Diagnosis


Diagnosis:: 





Mechanical fall with cervical strain and  L forehead laceration, s/p repair 

with Dermabond.


Incidental findings of heterogenous thyroid gland on cervical CT, pending 

further outpatient work up per Dr. Blue.


Dementia.











- Aftercare/Follow up Instructions


Aftercare/Follow-Up Instructions:: Refer to Discharge Instructions


Notes:: 





Wear C-collar as directed.


Keep L forehead wound clean and dry.


May take Tylenol 500 mg tab one tab po q6h prn pain.


Head Injury Instructions given.


Wound care instructions given.


Further care per Dr. Blue in nursing facility.


Medication Prescribed:: 





None





- Patient Disposition


Discharge/Transfer:: Long Term Care - SNF


Accepting Physician:: Dr. Blue


Transport Method:: BLS


Time:: 15:00


Condition at Disposition:: Stable, Improved





ED Discharge Plan





- Patient Disposition


Admit/Discharge/Transfer: Discharge/Transfered to SNF


Condition at Disposition: Stable


Instructions:  Fall Prevention and Home Safety, Easy-to-Read, Open Wound, 

Forehead, Easy-to-Read, Cervical Sprain, Easy-to-Read

## 2017-08-13 NOTE — DIAGNOSTIC IMAGING REPORT
CT cervical spine without IV contrast



HISTORY: CT cervical spine on 12/27/2016



COMPARISON: None



Technique: Axial images were obtained from the skull base to the upper

thoracic spine without IV contrast. Multiplanar reconstructions were

made. 

 Total DLP: 338,  CTDI17.4



FINDINGS:



Images of the cervical spine obtained without contrast demonstrate no

evidence of an acute fracture. Extensive multilevel degenerative changes

are seen with multilevel disc space loss of height advanced from C4

through C7. Advanced degenerative changes of the atlantodental

articulation is noted. Again seen is 3 mm posterior translation of the

left lateral mass of of C1 in relation to C2. There is 2 to 3 mm

anterolisthesis of C3 on C4 and C7 on T1 likely due to facet

arthropathy. No prevertebral soft tissue swelling. Heterogeneous thyroid

gland is noted. Biapical lung scarring is noted. Atherosclerosis is

noted.



IMPRESSION:



No evidence of an acute fracture.



Extensive multilevel degenerative changes. 



3 mm posterior translation of the left lateral mass of C1 in relation to

C2 which was also seen on prior exam and may be due to chronic

degenerative etiology and/or positioning.



3 mm anterolisthesis of C3 on C4 and C7 on T1 likely due to underlying

facet arthropathy.



Atherosclerotic vascular disease. 



Heterogeneous thyroid gland. Ultrasound would further clarify.

## 2017-08-13 NOTE — DIAGNOSTIC IMAGING REPORT
Head CT without intravenous contrast



Indication: Trauma



Comparison: Head CT on 12/27/2016 



Technique: Axial images were obtained from the vertex to the skull base

without IV contrast. Coronal reconstructions were made.   Total DLP:

640,  CTDI33



FINDINGS:



Images of the brain obtained without contrast demonstrate is no evidence

of an acute hemorrhage. Mild atrophy is noted. Moderate supratentorial

white matter disease is noted. The ventricles and basal cisterns are

patent. No mass effect or midline shift. No evidence of a skull

fracture. Soft tissue injury of the left forehead is noted. The

visualized paranasal sinuses are clear.



IMPRESSION: 



Soft tissue injury of the left forehead. No evidence of an skull

fracture.



No evidence of an acute intracranial hemorrhage.



Mild atrophy.



Moderate supratentorial white matter disease which is nonspecific and

may be due to chronic microvessel ischemia.

## 2018-01-08 ENCOUNTER — HOSPITAL ENCOUNTER (INPATIENT)
Dept: HOSPITAL 36 - ER | Age: 73
LOS: 4 days | Discharge: NURSING, REHAB OR LONG-TERM CARE | DRG: 871 | End: 2018-01-12
Attending: INTERNAL MEDICINE | Admitting: INTERNAL MEDICINE
Payer: MEDICARE

## 2018-01-08 VITALS — SYSTOLIC BLOOD PRESSURE: 131 MMHG | DIASTOLIC BLOOD PRESSURE: 66 MMHG

## 2018-01-08 DIAGNOSIS — M19.90: ICD-10-CM

## 2018-01-08 DIAGNOSIS — M81.0: ICD-10-CM

## 2018-01-08 DIAGNOSIS — F03.90: ICD-10-CM

## 2018-01-08 DIAGNOSIS — A41.9: Primary | ICD-10-CM

## 2018-01-08 DIAGNOSIS — E87.1: ICD-10-CM

## 2018-01-08 DIAGNOSIS — J96.00: ICD-10-CM

## 2018-01-08 DIAGNOSIS — J45.901: ICD-10-CM

## 2018-01-08 DIAGNOSIS — J20.9: ICD-10-CM

## 2018-01-08 DIAGNOSIS — J18.9: ICD-10-CM

## 2018-01-08 LAB
ANION GAP SERPL CALC-SCNC: 9.2 MMOL/L (ref 7–16)
BASOPHILS NFR BLD: 0 % (ref 0–3)
BUN SERPL-MCNC: 22 MG/DL (ref 7–25)
CALCIUM SERPL-MCNC: 9.4 MG/DL (ref 8.6–10.3)
CHLORIDE SERPL-SCNC: 99 MEQ/L (ref 98–107)
CO2 SERPL-SCNC: 28.6 MEQ/L (ref 21–31)
CREAT SERPL-MCNC: 0.7 MG/DL (ref 0.6–1.2)
EOSINOPHIL NFR BLD: 0 % (ref 0–5)
ERYTHROCYTE [DISTWIDTH] IN BLOOD BY AUTOMATED COUNT: 13.2 % (ref 11.5–20)
GLUCOSE SERPL-MCNC: 114 MG/DL (ref 70–105)
HCT VFR BLD CALC: 33.3 % (ref 41–60)
HGB BLD-MCNC: 11.4 GM/DL (ref 12–16)
LYMPHOCYTES # BLD MANUAL: 4 % (ref 20–50)
MANUAL DIFFERENTIAL PERFORMED BLD QL: YES
MCH RBC QN AUTO: 30.5 PG (ref 27–31)
MCHC RBC AUTO-ENTMCNC: 34.2 PG (ref 28–36)
MCV RBC AUTO: 89 FL (ref 81–100)
MONOCYTES # BLD MANUAL: 4 % (ref 2–10)
NEUTROPHILS NFR BLD AUTO: 86 % (ref 40–80)
NEUTS BAND NFR BLD: 6 % (ref 0–10)
PLAT MORPH BLD: NORMAL
PLATELET # BLD EST: ADEQUATE 10*3/UL
PLATELET # BLD: 471 TH/CMM (ref 150–400)
PMV BLD AUTO: 6.5 FL
POTASSIUM SERPL-SCNC: 3.8 MEQ/L (ref 3.5–5.1)
RBC # BLD AUTO: 3.74 MIL/CMM (ref 3.8–5.2)
RBC MORPH BLD: NORMAL
SODIUM SERPL-SCNC: 133 MEQ/L (ref 136–145)
TOTAL CELLS COUNTED BLD: 100
WBC # BLD AUTO: 21.6 TH/CMM (ref 4.8–10.8)

## 2018-01-08 PROCEDURE — Z7610: HCPCS

## 2018-01-08 RX ADMIN — ALBUTEROL SULFATE SCH MG: 2.5 SOLUTION RESPIRATORY (INHALATION) at 22:30

## 2018-01-08 RX ADMIN — IPRATROPIUM BROMIDE SCH MG: 0.5 SOLUTION RESPIRATORY (INHALATION) at 22:30

## 2018-01-08 RX ADMIN — DEXTROSE AND SODIUM CHLORIDE SCH MLS/HR: 5; .9 INJECTION, SOLUTION INTRAVENOUS at 16:47

## 2018-01-08 NOTE — DIAGNOSTIC IMAGING REPORT
Portable chest x-ray



History: Cough



The apical regions of the chest are obscured by the patient's overlying

head. Allowing for portable technique the heart size is normal. No focal

pulmonary parenchymal processes. No hilar or mediastinal abnormalities.



Impression: No definite acute abnormalities

## 2018-01-08 NOTE — HISTORY & PHYSICAL
ADMIT DATE:  01/08/2018



CHIEF COMPLAINT:  Low oxygen saturation.



HISTORY OF PRESENT ILLNESS:  This is a 72-year-old female with history of

osteoarthritis, osteoporosis, dementia, and psych disorder, was admitted for

nursing facility secondary to low O2 saturations in the 80s.  The patient ____

have been altered, not answering questions, 911 was called and the patient was

brought into the ER and noted to have elevated white count to 1000.  The patient

is congested and coughing.  The patient is a poor historian.



PAST MEDICAL HISTORY:  As mentioned in history present illness.



PAST SURGICAL HISTORY:  Unable to obtain from the patient.



ALLERGIES:  No known drug allergies.



MEDICATIONS:  Tylenol, Celexa, clonidine, Advair, guaifenesin, Namenda, and

Zyprexa.



FAMILY HISTORY:  Noncontributory.



SOCIAL HISTORY:  The patient is a nursing home patient requiring 24-hour total

care.



REVIEW OF SYSTEMS:  This is limited secondary to the patient's current mental

state.  We will try to obtain more detailed review of systems at a later date by

talking to family members.  There is Neeta Leblanc, family member, [phone],

also try to get information from nursing staff at [facility], [phone].



PHYSICAL EXAMINATION:

VITAL SIGNS:  Blood pressure 111/51, respirations 22, pulse 78, temperature

97.5.

GENERAL:  Elderly female, appears her stated age, appears chronically ill.

NECK:  Supple.  No mass.

LUNGS:  Equal breath sounds, few rhonchi.

HEART:  Regular rate and rhythm with systolic ejection murmur.

ABDOMEN:  Soft, globular.

EXTREMITIES:  Positive excoriation and atrophy.



LABORATORY DATA:  WBC 21, hemoglobin 11, and platelets 471.  Sodium 133,

potassium 3.8, BUN 22, creatinine 0.7, glucose 114, magnesium 1.8.



ASSESSMENT AND PLAN:  Fever, sepsis, acute respiratory failure, possible

pneumonia, acute asthma exacerbation, leukocytosis, osteoarthritis,

osteoporosis, dementia, low magnesium, hyponatremia.  We will correct

electrolyte abnormalities.  We will review the patient's chest x-ray.  We will

____ continue IV hydration, continue IV antibiotic, and bronchodilator

treatments.  We will continue to monitor the patient closely.  We will admit the

patient to telemetry.





DD: 01/08/2018 15:45

DT: 01/08/2018 16:28

JOB# [id number]  [id number]

## 2018-01-08 NOTE — ER PHYSICIAN DOCUMENTATION
DATE OF SERVICE:  01/08/2018



The patient was brought here because of cough and shortness of breath.



PRIMARY CARE PHYSICIAN:  Dr. Harshad Ugalde



EMERGENCY ROOM PHYSICIAN:  Dr. George Narayan



In the previous 277 days, the patient was here for some laceration.



CHIEF COMPLAINT:  Cough and shortness of breath, difficulty in breathing. 

According to the nurse, the patient is on 100%, oxygen saturation is close to

95%, blood pressure is 111.  According to the nurse, her blood pressure also was

lower.  The patient is now 100% oxygen nonrebreather mask.



HISTORY OF PRESENT ILLNESS:  The patient is a very poor historian.  She cannot

say that she does not know where she is, why she is here.  The patient was

brought here by some Paramedic ambulance.  She is a  lady.  The person

to notify in case of emergency is [place] [address], home

phone number is [phone].  Next of kin is same number, but work phone number

is [phone], all are the same.  The patient has Medicare part A and B as

well as Medicare.  She cannot answer any other questions.



REVIEW OF SYSTEMS:  Cannot be obtained from the patient.  I do not see any

surgical scars, do not see any pacemaker.  From looking at her, there is no

gross evidence of congestive heart failure.  Neck veins are not distended.  No

edema over the legs, no cyanosis, no petechia, no ecchymosis.  Chest shows

bilateral scattered rales and rhonchi.  Decreased air entry in both lungs.  AP

diameter of the chest is slightly increased, emphysematous looking chest wall. 

The patient denied ever smoking at least that _____ smoking or drinking or

taking any weeds or marijuana.  Abdomen is soft, benign and negative.  Liver,

spleen not enlarged.  No free fluid in the abdominal cavity.  Central nervous

system, the patient is confused.  It looks like she has dementia.  The patient

does not know where she is and she cannot give any complaint and no history of

anything has come, but since Dr. Ugalde knows her very well and pretty sure

that he will add to history, physical in the hospital dictation.  The patient

looks like demented, but moves all the extremities.  Costovertebral angles are

normal and the patient does not have any evidence of cancer, tumors or masses.



CLINICAL IMPRESSION:  The patient has evidence of acute bronchitis and I made

her cough, she has congested chest.  She brings up some white to yellowish

phlegm and she was asked to cough twice, she did follow the instructions and she

definitely has congestion, most likely from both the lungs.  Underlying

pneumonia cannot be ruled out.  We can get some flu antigen studies on the

patient.



PLAN:  To get the patient antibiotics and get a chest x-ray done and get some

labs workup done and I believe the patient would need admission to the hospital.

 We will call Dr. Ugalde for admission.





DD: 01/08/2018 13:54

DT: 01/08/2018 16:03

JOB# [id number]  [id number]

## 2018-01-09 LAB
ALBUMIN SERPL-MCNC: 2.8 GM/DL (ref 3.7–5.3)
ALBUMIN/GLOB SERPL: 0.8 {RATIO} (ref 1–1.8)
ALP SERPL-CCNC: 98 U/L (ref 34–104)
ALT SERPL-CCNC: 14 U/L (ref 7–52)
AMMONIA BLD-SCNC: 51 UMOL/L (ref 16–53)
ANION GAP SERPL CALC-SCNC: 8.8 MMOL/L (ref 7–16)
AST SERPL-CCNC: 21 U/L (ref 13–39)
BILIRUB SERPL-MCNC: 0.4 MG/DL (ref 0.3–1)
BUN SERPL-MCNC: 23 MG/DL (ref 7–25)
CALCIUM SERPL-MCNC: 8.8 MG/DL (ref 8.6–10.3)
CHLORIDE SERPL-SCNC: 102 MEQ/L (ref 98–107)
CO2 SERPL-SCNC: 29.4 MEQ/L (ref 21–31)
CREAT SERPL-MCNC: 0.7 MG/DL (ref 0.6–1.2)
ERYTHROCYTE [DISTWIDTH] IN BLOOD BY AUTOMATED COUNT: 13.3 % (ref 11.5–20)
GLOBULIN SER-MCNC: 3.4 GM/DL
GLUCOSE SERPL-MCNC: 153 MG/DL (ref 70–105)
HCT VFR BLD CALC: 33.1 % (ref 41–60)
HGB BLD-MCNC: 11.1 GM/DL (ref 12–16)
LYMPHOCYTES # BLD MANUAL: 2 % (ref 20–50)
MAGNESIUM SERPL-MCNC: 2.3 MG/DL (ref 1.9–2.7)
MANUAL DIFFERENTIAL PERFORMED BLD QL: YES
MCH RBC QN AUTO: 30.1 PG (ref 27–31)
MCHC RBC AUTO-ENTMCNC: 33.7 PG (ref 28–36)
MCV RBC AUTO: 89.5 FL (ref 81–100)
MONOCYTES # BLD MANUAL: 5 % (ref 2–10)
NEUTROPHILS NFR BLD AUTO: 84 % (ref 40–80)
NEUTS BAND NFR BLD: 9 % (ref 0–10)
PLATELET # BLD: 454 TH/CMM (ref 150–400)
PMV BLD AUTO: 6.7 FL
POTASSIUM SERPL-SCNC: 4.2 MEQ/L (ref 3.5–5.1)
RBC # BLD AUTO: 3.7 MIL/CMM (ref 3.8–5.2)
SODIUM SERPL-SCNC: 136 MEQ/L (ref 136–145)
TOTAL CELLS COUNTED BLD: 100
WBC # BLD AUTO: 29.3 TH/CMM (ref 4.8–10.8)

## 2018-01-09 RX ADMIN — Medication SCH TAB: at 08:55

## 2018-01-09 RX ADMIN — IPRATROPIUM BROMIDE SCH MG: 0.5 SOLUTION RESPIRATORY (INHALATION) at 19:29

## 2018-01-09 RX ADMIN — IPRATROPIUM BROMIDE SCH MG: 0.5 SOLUTION RESPIRATORY (INHALATION) at 11:43

## 2018-01-09 RX ADMIN — IPRATROPIUM BROMIDE SCH MG: 0.5 SOLUTION RESPIRATORY (INHALATION) at 07:04

## 2018-01-09 RX ADMIN — ALBUTEROL SULFATE SCH MG: 2.5 SOLUTION RESPIRATORY (INHALATION) at 07:04

## 2018-01-09 RX ADMIN — DEXTROSE AND SODIUM CHLORIDE SCH MLS/HR: 5; .45 INJECTION, SOLUTION INTRAVENOUS at 13:57

## 2018-01-09 RX ADMIN — ALBUTEROL SULFATE SCH MG: 2.5 SOLUTION RESPIRATORY (INHALATION) at 19:29

## 2018-01-09 RX ADMIN — DEXTROSE AND SODIUM CHLORIDE SCH MLS/HR: 5; .9 INJECTION, SOLUTION INTRAVENOUS at 12:21

## 2018-01-09 RX ADMIN — ALBUTEROL SULFATE SCH MG: 2.5 SOLUTION RESPIRATORY (INHALATION) at 11:43

## 2018-01-09 NOTE — DIAGNOSTIC IMAGING REPORT
CHEST X-RAY: AP view



INDICATION: Pneumonia



COMPARISON: 1/8/2018



FINDINGS: Congestive changes seen with small right effusion and right

basal infiltrates. Mild cardiomegaly is noted.



IMPRESSION:



Congestive changes with small right effusion and right basal

infiltrates.

## 2018-01-09 NOTE — DIAGNOSTIC IMAGING REPORT
CT Chest without contrast



HISTORY: Pneumonia



COMPARISON: Chest x-ray 1/9/2018.



Technique: Axial images were obtained from the base of the neck to the

upper abdomen without IV contrast. Reconstructions were made. Total DLP

375 CTD I 19.4



Findings:



Evaluation of the mediastinum is limited due to lack of IV contrast. No

evidence of mediastinal lymphadenopathy. That ascending aorta measures

up to 3.7 cm. Tortuous aorta is noted. Mild-to-moderate atherosclerosis

noted. No pericardial effusion. Heart size is normal.



The Lung fields demonstrate atelectatic and hypoventilatory changes with

multifocal pulmonary infiltrates and bibasal consolidation. Small

bilateral pleural effusions are noted. 



The upper abdomen demonstrates nonspecific bilateral perinephric

inflammatory changes. The pancreatic gland atrophy is noted.

Postsurgical changes of the upper abdomen are noted. Diverticulosis is

noted. Advanced degenerative changes of the spine are noted with

scoliosis



IMPRESSION:



Multifocal pulmonary infiltrates and bibasal consolidative changes and

small bilateral effusions. Findings may be due to pneumonia and/or CHF.



Mild to moderate atherosclerotic vascular disease.



Nonspecific bilateral perinephric inflammatory changes



Diverticulosis.

## 2018-01-09 NOTE — INTERNAL MEDICINE PROG NOTE
Internal Medicine Subjective





- Subjective


Service Date: 01/09/18 (on venti mask noted with expiratory and inspiratory 

wheezes )


Patient seen and examined:: with staff


Patient is:: awake, verbal


Patient Complaints of:: congestion, cough


Per staff patient has:: tolerating meds





Internal Medicine Objective





- Results


Result Diagrams: 


 01/09/18 05:00





 01/09/18 05:00


Recent Labs: 


 Laboratory Last Values











WBC  29.3 Th/cmm (4.8-10.8)  H*  01/09/18  05:00    


 


RBC  3.70 Mil/cmm (3.80-5.20)  L  01/09/18  05:00    


 


Hgb  11.1 gm/dL (12-16)  L  01/09/18  05:00    


 


Hct  33.1 % (41.0-60)  L  01/09/18  05:00    


 


MCV  89.5 fl ()   01/09/18  05:00    


 


MCH  30.1 pg (27.0-31.0)   01/09/18  05:00    


 


MCHC Differential  33.7 pg (28.0-36.0)   01/09/18  05:00    


 


RDW  13.3 % (11.5-20.0)   01/09/18  05:00    


 


Plt Count  454 Th/cmm (150-400)  H  01/09/18  05:00    


 


MPV  6.7 fl  01/09/18  05:00    


 


Band Neutrophils %  9 % (0-10)   01/09/18  05:00    


 


Neutrophils (Manual)  84 % (40-80)  H  01/09/18  05:00    


 


Lymphocytes  2 % (20-50)  L  01/09/18  05:00    


 


Monocytes  5 % (2-10)   01/09/18  05:00    


 


Eosinophils  0 % (0-5)   01/08/18  14:00    


 


Basophils  0 % (0-3)   01/08/18  14:00    


 


Platelet Estimate  ADEQUATE  (NORMAL)   01/08/18  14:00    


 


Platelet Morphology  NORMAL  (NORMAL)   01/08/18  14:00    


 


RBC Morph Micro Appear  NORMAL  (NORMAL)   01/08/18  14:00    


 


Sodium  136 mEq/L (136-145)   01/09/18  05:00    


 


Potassium  4.2 mEq/L (3.5-5.1)   01/09/18  05:00    


 


Chloride  102 mEq/L ()   01/09/18  05:00    


 


Carbon Dioxide  29.4 mEq/L (21.0-31.0)   01/09/18  05:00    


 


Anion Gap  8.8  (7.0-16.0)   01/09/18  05:00    


 


BUN  23 mg/dL (7-25)   01/09/18  05:00    


 


Creatinine  0.7 mg/dL (0.6-1.2)   01/09/18  05:00    


 


Est GFR ( Amer)  TNP   01/09/18  05:00    


 


Est GFR (Non-Af Amer)  TNP   01/09/18  05:00    


 


BUN/Creatinine Ratio  32.9   01/09/18  05:00    


 


Glucose  153 mg/dL ()  H  01/09/18  05:00    


 


Whole Bld Lactic Acid  0.66 mmol/L (0.60-1.99)   01/08/18  14:00    


 


Calcium  8.8 mg/dL (8.6-10.3)   01/09/18  05:00    


 


Magnesium  2.3 mg/dL (1.9-2.7)   01/09/18  05:00    


 


Total Bilirubin  0.4 mg/dL (0.3-1.0)   01/09/18  05:00    


 


AST  21 U/L (13-39)   01/09/18  05:00    


 


ALT  14 U/L (7-52)   01/09/18  05:00    


 


Alkaline Phosphatase  98 U/L ()   01/09/18  05:00    


 


Ammonia  51 umol/L (16-53)   01/09/18  05:00    


 


Troponin I  0.02 ng/mL (0.01-0.05)   01/08/18  14:00    


 


C-Reactive Protein  39.6 mg/dL (0.0-0.9)  H  01/08/18  14:00    


 


B-Natriuretic Peptide  167.0 pg/mL (5.0-100.0)  H  01/08/18  14:00    


 


Total Protein  6.2 gm/dL (6.0-8.3)   01/09/18  05:00    


 


Albumin  2.8 gm/dL (3.7-5.3)  L  01/09/18  05:00    


 


Globulin  3.4 gm/dL  01/09/18  05:00    


 


Albumin/Globulin Ratio  0.8  (1.0-1.8)  L  01/09/18  05:00    


 


TSH  0.11 uIU/ml (0.34-5.60)  L  01/09/18  05:00    














- Physical Exam


Vitals and I&O: 


 Vital Signs











Temp  96.8 F   01/09/18 12:00


 


Pulse  88   01/09/18 12:09


 


Resp  22   01/09/18 12:09


 


BP  94/59   01/09/18 12:00


 


Pulse Ox  93   01/09/18 12:09








 Intake & Output











 01/08/18 01/09/18 01/09/18





 18:59 06:59 18:59


 


Intake Total  1000 


 


Balance  1000 


 


Weight (lbs)  140 lb 


 


Intake:   


 


  Intake, IV Amount  1000 


 


    D5-0.9%Ns 1,000 ml @ 80  1000 





    mls/hr IV .[id number] [facility] Rx   





    #:[id number]   


 


Other:   


 


  # Voids  2 


 


  # Bowel Movements  0 











Active Medications: 


Current Medications





Acetaminophen (Tylenol)  650 mg PO Q4H PRN


   PRN Reason: Pain Or Fever above 101


   Stop: 03/09/18 15:34


Albuterol Sulfate (Albuterol 2.5mg/3ml Neb Ud)  2.5 mg HHN Q4HRT PRN


   PRN Reason: sob


   Stop: 03/09/18 15:27


Albuterol Sulfate (Albuterol 2.5mg/3ml Neb Ud)  2.5 mg HHN QIDRT [facility]


   Stop: 03/09/18 18:59


   Last Admin: 01/09/18 11:43 Dose:  2.5 mg


Aspirin (Ecotrin)  81 mg PO DAILY [facility]


   Stop: 03/10/18 08:59


   Last Admin: 01/09/18 08:54 Dose:  81 mg


Citalopram Hydrobromide (Celexa)  20 mg PO DAILY [facility]


   PRN Reason: Protocol


   Stop: 03/10/18 08:59


Donepezil HCl (Aricept)  10 mg PO HS [facility]


   Stop: 03/09/18 20:59


   Last Admin: 01/08/18 22:04 Dose:  10 mg


Cefepime HCl 1 gm/ Dextrose  50 mls @ 100 mls/hr IV Q12H [facility]


   Stop: 03/09/18 15:29


   Last Admin: 01/09/18 02:40 Dose:  100 mls/hr


Dextrose/Sodium Chloride (D5-0.45ns)  1,000 mls @ 60 mls/hr IV .[id number] [facility]


   Stop: 03/10/18 13:14


   Last Admin: 01/09/18 13:57 Dose:  60 mls/hr


Vancomycin HCl 1 gm/ Sodium (Chloride)  250 mls @ 165 mls/hr IV Q24HR@0900 [facility]


   Stop: 03/10/18 14:59


   Last Admin: 01/09/18 15:02 Dose:  165 mls/hr


Ipratropium Bromide (Atrovent Neb 0.5mg/2.5ml)  0.5 mg IH QIDRT [facility]


   Stop: 03/09/18 18:59


   Last Admin: 01/09/18 11:43 Dose:  0.5 mg


Memantine (Namenda)  10 mg PO DAILY [facility]


   Stop: 03/10/18 08:59


   Last Admin: 01/09/18 08:54 Dose:  10 mg


Miscellaneous (Fluticasone/Salmeterol [Advair 250-50 Diskus])  1 puff INH BID 

[facility]


   Stop: 03/09/18 16:59


Miscellaneous (Vancomycin Iv Per Pharmacy)  1 ea MC PRN [facility]


   Stop: 03/09/18 15:44


Nitroglycerin (Nitrostat)  0.4 mg SL Q5MIN PRN


   PRN Reason: Chest Pain


   Stop: 03/09/18 15:34


Olanzapine (Zyprexa)  2.4 mg PO HS [facility]


   PRN Reason: Protocol


   Stop: 03/09/18 20:59








General: weak, alert


HEENT: NC/AT, PERRLA


Lungs: wheezing


Cardiovascular: RRR, without murmur


Abdomen: soft, non-tender, non-distended, positive bowel sound


Neurological: muscle weakness





Internal Medicine Assmt/Plan





- Assessment


Assessment: 





fever


sepsis


acute respiratory failure


pna


acute asthma exacerbation


leukocytosis


oa


osteoporosis


dementia


low magnesium


hyponatremia








- Plan


Plan: 





continue with iv solumedrol


inhalation treatments


supplemental oxygen


empiric iv antibiotics


follow up labs in am


continue current plan of care

## 2018-01-10 LAB
ANION GAP SERPL CALC-SCNC: 7.3 MMOL/L (ref 7–16)
BASOPHILS # BLD AUTO: 0 TH/CUMM (ref 0–0.2)
BUN SERPL-MCNC: 22 MG/DL (ref 7–25)
CALCIUM SERPL-MCNC: 9.3 MG/DL (ref 8.6–10.3)
CHLORIDE SERPL-SCNC: 105 MEQ/L (ref 98–107)
CO2 SERPL-SCNC: 29.1 MEQ/L (ref 21–31)
CREAT SERPL-MCNC: 0.7 MG/DL (ref 0.6–1.2)
EOSINOPHIL # BLD AUTO: 0 TH/CMM (ref 0.1–0.4)
ERYTHROCYTE [DISTWIDTH] IN BLOOD BY AUTOMATED COUNT: 13.9 % (ref 11.5–20)
GLUCOSE SERPL-MCNC: 248 MG/DL (ref 70–105)
HBA1C MFR BLD: 6.8 % (ref 4–6)
HCT VFR BLD CALC: 34.1 % (ref 41–60)
HGB BLD-MCNC: 11.9 GM/DL (ref 12–16)
HGB BLD-MCNC: 14 G/DL (ref 4–35)
LYMPHOCYTE AB SER FC-ACNC: 0.4 TH/CMM (ref 1.5–3)
LYMPHOCYTES # BLD MANUAL: 2 % (ref 20–50)
MAGNESIUM SERPL-MCNC: 2.3 MG/DL (ref 1.9–2.7)
MANUAL DIFFERENTIAL PERFORMED BLD QL: YES
MCH RBC QN AUTO: 30.4 PG (ref 27–31)
MCHC RBC AUTO-ENTMCNC: 34.8 PG (ref 28–36)
MCV RBC AUTO: 87.4 FL (ref 81–100)
MONOCYTES # BLD AUTO: 0.3 TH/CMM (ref 0.3–1)
MONOCYTES # BLD MANUAL: 1 % (ref 2–10)
NEUTROPHILS # BLD: 21.9 TH/CMM (ref 1.8–8)
NEUTROPHILS NFR BLD AUTO: 86 % (ref 40–80)
NEUTS BAND NFR BLD: 11 % (ref 0–10)
PLATELET # BLD: 455 TH/CMM (ref 150–400)
PMV BLD AUTO: 7 FL
POTASSIUM SERPL-SCNC: 4.4 MEQ/L (ref 3.5–5.1)
RBC # BLD AUTO: 3.9 MIL/CMM (ref 3.8–5.2)
SODIUM SERPL-SCNC: 137 MEQ/L (ref 136–145)
TOTAL CELLS COUNTED BLD: 100
WBC # BLD AUTO: 22.6 TH/CMM (ref 4.8–10.8)

## 2018-01-10 RX ADMIN — Medication SCH TAB: at 09:36

## 2018-01-10 RX ADMIN — IPRATROPIUM BROMIDE SCH MG: 0.5 SOLUTION RESPIRATORY (INHALATION) at 21:03

## 2018-01-10 RX ADMIN — ALBUTEROL SULFATE SCH MG: 2.5 SOLUTION RESPIRATORY (INHALATION) at 21:04

## 2018-01-10 RX ADMIN — IPRATROPIUM BROMIDE SCH MG: 0.5 SOLUTION RESPIRATORY (INHALATION) at 15:46

## 2018-01-10 RX ADMIN — ALBUTEROL SULFATE SCH MG: 2.5 SOLUTION RESPIRATORY (INHALATION) at 11:32

## 2018-01-10 RX ADMIN — ALBUTEROL SULFATE SCH MG: 2.5 SOLUTION RESPIRATORY (INHALATION) at 06:47

## 2018-01-10 RX ADMIN — DEXTROSE AND SODIUM CHLORIDE SCH MLS/HR: 5; .45 INJECTION, SOLUTION INTRAVENOUS at 15:31

## 2018-01-10 RX ADMIN — IPRATROPIUM BROMIDE SCH MG: 0.5 SOLUTION RESPIRATORY (INHALATION) at 06:47

## 2018-01-10 RX ADMIN — IPRATROPIUM BROMIDE SCH MG: 0.5 SOLUTION RESPIRATORY (INHALATION) at 11:32

## 2018-01-10 RX ADMIN — ALBUTEROL SULFATE SCH MG: 2.5 SOLUTION RESPIRATORY (INHALATION) at 15:46

## 2018-01-10 NOTE — INTERNAL MEDICINE PROG NOTE
Internal Medicine Subjective





- Subjective


Service Date: 01/10/18


Patient is:: awake, verbal


Patient Complaints of:: congestion, cough


Per staff patient has:: tolerating meds





Internal Medicine Objective





- Results


Result Diagrams: 


 01/10/18 05:30





 01/10/18 05:30


Recent Labs: 


 Laboratory Last Values











WBC  22.6 Th/cmm (4.8-10.8)  H*  01/10/18  05:30    


 


RBC  3.90 Mil/cmm (3.80-5.20)   01/10/18  05:30    


 


Hgb  11.9 gm/dL (12-16)  L  01/10/18  05:30    


 


Hct  34.1 % (41.0-60)  L  01/10/18  05:30    


 


MCV  87.4 fl ()   01/10/18  05:30    


 


MCH  30.4 pg (27.0-31.0)   01/10/18  05:30    


 


MCHC Differential  34.8 pg (28.0-36.0)   01/10/18  05:30    


 


RDW  13.9 % (11.5-20.0)   01/10/18  05:30    


 


Plt Count  455 Th/cmm (150-400)  H  01/10/18  05:30    


 


MPV  7.0 fl  01/10/18  05:30    


 


Band Neutrophils %  11 % (0-10)  H  01/10/18  05:30    


 


Lymphocytes %  NP   01/10/18  05:30    


 


Monocytes %  NP   01/10/18  05:30    


 


Eosinophils %  NP   01/10/18  05:30    


 


Neutrophils (Manual)  86 % (40-80)  H  01/10/18  05:30    


 


Lymphocytes  2 % (20-50)  L  01/10/18  05:30    


 


Monocytes  1 % (2-10)  L  01/10/18  05:30    


 


Eosinophils  0 % (0-5)   01/08/18  14:00    


 


Basophils  0 % (0-3)   01/08/18  14:00    


 


Platelet Estimate  ADEQUATE  (NORMAL)   01/08/18  14:00    


 


Platelet Morphology  NORMAL  (NORMAL)   01/08/18  14:00    


 


RBC Morph Micro Appear  NORMAL  (NORMAL)   01/08/18  14:00    


 


Sodium  137 mEq/L (136-145)   01/10/18  05:30    


 


Potassium  4.4 mEq/L (3.5-5.1)   01/10/18  05:30    


 


Chloride  105 mEq/L ()   01/10/18  05:30    


 


Carbon Dioxide  29.1 mEq/L (21.0-31.0)   01/10/18  05:30    


 


Anion Gap  7.3  (7.0-16.0)   01/10/18  05:30    


 


BUN  22 mg/dL (7-25)   01/10/18  05:30    


 


Creatinine  0.7 mg/dL (0.6-1.2)   01/10/18  05:30    


 


Est GFR ( Amer)  TNP   01/10/18  05:30    


 


Est GFR (Non-Af Amer)  TNP   01/10/18  05:30    


 


BUN/Creatinine Ratio  31.4   01/10/18  05:30    


 


Glucose  248 mg/dL ()  H  01/10/18  05:30    


 


Hemoglobin A1c %  6.8 % (4.0-6.0)  H  01/10/18  05:30    


 


Whole Bld Lactic Acid  0.66 mmol/L (0.60-1.99)   01/08/18  14:00    


 


Calcium  9.3 mg/dL (8.6-10.3)   01/10/18  05:30    


 


Magnesium  2.3 mg/dL (1.9-2.7)   01/10/18  05:30    


 


Total Bilirubin  0.4 mg/dL (0.3-1.0)   01/09/18  05:00    


 


AST  21 U/L (13-39)   01/09/18  05:00    


 


ALT  14 U/L (7-52)   01/09/18  05:00    


 


Alkaline Phosphatase  98 U/L ()   01/09/18  05:00    


 


Ammonia  64 umol/L (16-53)  H  01/10/18  05:30    


 


Troponin I  0.02 ng/mL (0.01-0.05)   01/08/18  14:00    


 


C-Reactive Protein  39.6 mg/dL (0.0-0.9)  H  01/08/18  14:00    


 


B-Natriuretic Peptide  271.0 pg/mL (5.0-100.0)  H  01/10/18  05:30    


 


Total Protein  6.2 gm/dL (6.0-8.3)   01/09/18  05:00    


 


Albumin  2.8 gm/dL (3.7-5.3)  L  01/09/18  05:00    


 


Globulin  3.4 gm/dL  01/09/18  05:00    


 


Albumin/Globulin Ratio  0.8  (1.0-1.8)  L  01/09/18  05:00    


 


TSH  0.11 uIU/ml (0.34-5.60)  L  01/09/18  05:00    


 


Vancomycin Trough  7.6 ug/mL (10-20)  L  01/10/18  08:08    














- Physical Exam


Vitals and I&O: 


 Vital Signs











Temp  97.8 F   01/10/18 12:01


 


Pulse  100   01/10/18 12:01


 


Resp  18   01/10/18 12:01


 


BP  159/82   01/10/18 12:01


 


Pulse Ox  94   01/10/18 12:01








 Intake & Output











 01/09/18 01/10/18 01/10/18





 18:59 06:59 18:59


 


Intake Total 300  


 


Balance 300  


 


Weight (lbs) 152 lb 150 lb 3.2 oz 


 


Intake:   


 


  Intake, IV Amount 300  


 


    Cefepime 1 gm In Dextrose 50  





    5% 50 ml @ 100 mls/hr IV   





    Q12H [facility] Rx#:[id number]   


 


    Vancomycin HCl 1 gm In 250  





    Sodium Chloride 0.9% 250   





    ml @ 165 mls/hr IV Q24HR@   





    0900 [facility] Rx#:[id number]   


 


Other:   


 


  # Voids 3 3 


 


  # Bowel Movements 0  











Active Medications: 


Current Medications





Acetaminophen (Tylenol)  650 mg PO Q4H PRN


   PRN Reason: Pain Or Fever above 101


   Stop: 03/09/18 15:34


Albuterol Sulfate (Albuterol 2.5mg/3ml Neb Ud)  2.5 mg HHN Q4HRT PRN


   PRN Reason: sob


   Stop: 03/09/18 15:27


   Last Admin: 01/10/18 01:21 Dose:  2.5 mg


Albuterol Sulfate (Albuterol 2.5mg/3ml Neb Ud)  2.5 mg HHN QIDRT [facility]


   Stop: 03/09/18 18:59


   Last Admin: 01/10/18 11:32 Dose:  2.5 mg


Aspirin (Ecotrin)  81 mg PO DAILY [facility]


   Stop: 03/10/18 08:59


   Last Admin: 01/10/18 09:37 Dose:  81 mg


Citalopram Hydrobromide (Celexa)  20 mg PO DAILY [facility]


   PRN Reason: Protocol


   Stop: 03/10/18 08:59


Donepezil HCl (Aricept)  10 mg PO HS [facility]


   Stop: 03/09/18 20:59


   Last Admin: 01/09/18 20:15 Dose:  10 mg


Cefepime HCl 1 gm/ Dextrose  50 mls @ 100 mls/hr IV Q12H [facility]


   Stop: 03/09/18 15:29


   Last Admin: 01/10/18 02:43 Dose:  100 mls/hr


Dextrose/Sodium Chloride (D5-0.45ns)  1,000 mls @ 60 mls/hr IV .[id number] [facility]


   Stop: 03/10/18 13:14


   Last Admin: 01/09/18 13:57 Dose:  60 mls/hr


Vancomycin HCl 1 gm/ Sodium (Chloride)  250 mls @ 165 mls/hr IV Q12H [facility]


   Stop: 03/11/18 20:59


Ipratropium Bromide (Atrovent Neb 0.5mg/2.5ml)  0.5 mg IH QIDRT [facility]


   Stop: 03/09/18 18:59


   Last Admin: 01/10/18 11:32 Dose:  0.5 mg


Memantine (Namenda)  10 mg PO DAILY [facility]


   Stop: 03/10/18 08:59


   Last Admin: 01/10/18 09:36 Dose:  10 mg


Methylprednisolone Sodium Succinate (Solu-Medrol)  80 mg IVP Q12HR [facility]


   Stop: 03/10/18 20:59


   Last Admin: 01/10/18 09:36 Dose:  80 mg


Miscellaneous (Fluticasone/Salmeterol [Advair 250-50 Diskus])  1 puff INH BID 

[facility]


   Stop: 03/09/18 16:59


Miscellaneous (Vancomycin Iv Per Pharmacy)  1 ea MC PRN [facility]


   Stop: 03/09/18 15:44


Nitroglycerin (Nitrostat)  0.4 mg SL Q5MIN PRN


   PRN Reason: Chest Pain


   Stop: 03/09/18 15:34


Olanzapine (Zyprexa)  2.4 mg PO HS [facility]


   PRN Reason: Protocol


   Stop: 03/09/18 20:59








General: weak, alert


HEENT: NC/AT, PERRLA


Lungs: wheezing


Cardiovascular: RRR, without murmur


Abdomen: soft, non-tender, non-distended, positive bowel sound


Neurological: muscle weakness





Internal Medicine Assmt/Plan





- Assessment


Assessment: 





fever


sepsis


acute respiratory failure


pna


acute asthma exacerbation


leukocytosis


oa


osteoporosis


dementia


low magnesium


hyponatremia








- Plan


Plan: 





continue with iv solumedrol


inhalation treatments


supplemental oxygen


empiric iv antibiotics


follow up labs in am


continue current plan of care

## 2018-01-11 LAB
ANION GAP SERPL CALC-SCNC: 6.2 MMOL/L (ref 7–16)
BASOPHILS # BLD AUTO: 0 TH/CUMM (ref 0–0.2)
BUN SERPL-MCNC: 27 MG/DL (ref 7–25)
CALCIUM SERPL-MCNC: 9.7 MG/DL (ref 8.6–10.3)
CHLORIDE SERPL-SCNC: 106 MEQ/L (ref 98–107)
CO2 SERPL-SCNC: 30.8 MEQ/L (ref 21–31)
CREAT SERPL-MCNC: 0.6 MG/DL (ref 0.6–1.2)
EOSINOPHIL # BLD AUTO: 0 TH/CMM (ref 0.1–0.4)
ERYTHROCYTE [DISTWIDTH] IN BLOOD BY AUTOMATED COUNT: 13.3 % (ref 11.5–20)
GLUCOSE SERPL-MCNC: 182 MG/DL (ref 70–105)
HCT VFR BLD CALC: 33.1 % (ref 41–60)
HGB BLD-MCNC: 11.2 GM/DL (ref 12–16)
LYMPHOCYTE AB SER FC-ACNC: 0.5 TH/CMM (ref 1.5–3)
LYMPHOCYTES # BLD MANUAL: 2 % (ref 20–50)
MAGNESIUM SERPL-MCNC: 1.9 MG/DL (ref 1.9–2.7)
MANUAL DIFFERENTIAL PERFORMED BLD QL: YES
MCH RBC QN AUTO: 30.4 PG (ref 27–31)
MCHC RBC AUTO-ENTMCNC: 33.8 PG (ref 28–36)
MCV RBC AUTO: 90 FL (ref 81–100)
MONOCYTES # BLD AUTO: 0.5 TH/CMM (ref 0.3–1)
MONOCYTES # BLD MANUAL: 1 % (ref 2–10)
NEUTROPHILS # BLD: 17.7 TH/CMM (ref 1.8–8)
NEUTROPHILS NFR BLD AUTO: 95 % (ref 40–80)
NEUTS BAND NFR BLD: 2 % (ref 0–10)
PLATELET # BLD EST: (no result) 10*3/UL
PLATELET # BLD: 590 TH/CMM (ref 150–400)
PMV BLD AUTO: 6.6 FL
POTASSIUM SERPL-SCNC: 4 MEQ/L (ref 3.5–5.1)
RBC # BLD AUTO: 3.67 MIL/CMM (ref 3.8–5.2)
SODIUM SERPL-SCNC: 139 MEQ/L (ref 136–145)
TOTAL CELLS COUNTED BLD: 100
VIT B12 SERPL-MCNC: 921 PG/ML
WBC # BLD AUTO: 18.7 TH/CMM (ref 4.8–10.8)

## 2018-01-11 RX ADMIN — IPRATROPIUM BROMIDE SCH MG: 0.5 SOLUTION RESPIRATORY (INHALATION) at 07:10

## 2018-01-11 RX ADMIN — IPRATROPIUM BROMIDE SCH MG: 0.5 SOLUTION RESPIRATORY (INHALATION) at 11:35

## 2018-01-11 RX ADMIN — ALBUTEROL SULFATE SCH MG: 2.5 SOLUTION RESPIRATORY (INHALATION) at 11:35

## 2018-01-11 RX ADMIN — IPRATROPIUM BROMIDE SCH MG: 0.5 SOLUTION RESPIRATORY (INHALATION) at 19:46

## 2018-01-11 RX ADMIN — ALBUTEROL SULFATE SCH MG: 2.5 SOLUTION RESPIRATORY (INHALATION) at 19:46

## 2018-01-11 RX ADMIN — ALBUTEROL SULFATE SCH MG: 2.5 SOLUTION RESPIRATORY (INHALATION) at 14:54

## 2018-01-11 RX ADMIN — ALBUTEROL SULFATE SCH MG: 2.5 SOLUTION RESPIRATORY (INHALATION) at 07:09

## 2018-01-11 RX ADMIN — Medication SCH TAB: at 09:39

## 2018-01-11 RX ADMIN — IPRATROPIUM BROMIDE SCH MG: 0.5 SOLUTION RESPIRATORY (INHALATION) at 14:54

## 2018-01-12 LAB
ALBUMIN SERPL-MCNC: 2.8 GM/DL (ref 3.7–5.3)
ALBUMIN/GLOB SERPL: 0.8 {RATIO} (ref 1–1.8)
ALP SERPL-CCNC: 94 U/L (ref 34–104)
ALT SERPL-CCNC: 27 U/L (ref 7–52)
ANION GAP SERPL CALC-SCNC: 8.4 MMOL/L (ref 7–16)
AST SERPL-CCNC: 29 U/L (ref 13–39)
BASOPHILS # BLD AUTO: 0 TH/CUMM (ref 0–0.2)
BILIRUB SERPL-MCNC: 0.3 MG/DL (ref 0.3–1)
BUN SERPL-MCNC: 33 MG/DL (ref 7–25)
CALCIUM SERPL-MCNC: 9.5 MG/DL (ref 8.6–10.3)
CHLORIDE SERPL-SCNC: 106 MEQ/L (ref 98–107)
CO2 SERPL-SCNC: 28.7 MEQ/L (ref 21–31)
CREAT SERPL-MCNC: 0.6 MG/DL (ref 0.6–1.2)
EOSINOPHIL # BLD AUTO: 0 TH/CMM (ref 0.1–0.4)
ERYTHROCYTE [DISTWIDTH] IN BLOOD BY AUTOMATED COUNT: 13.5 % (ref 11.5–20)
FOLATE SERPL-MCNC: 13.5 NG/ML (ref 3–?)
GLOBULIN SER-MCNC: 3.4 GM/DL
GLUCOSE SERPL-MCNC: 180 MG/DL (ref 70–105)
HCT VFR BLD CALC: 32.2 % (ref 41–60)
HGB BLD-MCNC: 11.2 GM/DL (ref 12–16)
LYMPHOCYTE AB SER FC-ACNC: 0.6 TH/CMM (ref 1.5–3)
MAGNESIUM SERPL-MCNC: 1.9 MG/DL (ref 1.9–2.7)
MCH RBC QN AUTO: 31.5 PG (ref 27–31)
MCHC RBC AUTO-ENTMCNC: 34.6 PG (ref 28–36)
MCV RBC AUTO: 91 FL (ref 81–100)
MONOCYTES # BLD AUTO: 0.4 TH/CMM (ref 0.3–1)
NEUTROPHILS # BLD: 12.3 TH/CMM (ref 1.8–8)
PLATELET # BLD: 587 TH/CMM (ref 150–400)
PMV BLD AUTO: 6.8 FL
POTASSIUM SERPL-SCNC: 4.1 MEQ/L (ref 3.5–5.1)
RBC # BLD AUTO: 3.54 MIL/CMM (ref 3.8–5.2)
SODIUM SERPL-SCNC: 139 MEQ/L (ref 136–145)
VIT B12 SERPL-MCNC: 682 PG/ML (ref 232–1245)
WBC # BLD AUTO: 13.3 TH/CMM (ref 4.8–10.8)

## 2018-01-12 RX ADMIN — ALBUTEROL SULFATE SCH MG: 2.5 SOLUTION RESPIRATORY (INHALATION) at 11:50

## 2018-01-12 RX ADMIN — IPRATROPIUM BROMIDE SCH MG: 0.5 SOLUTION RESPIRATORY (INHALATION) at 07:43

## 2018-01-12 RX ADMIN — ALBUTEROL SULFATE SCH MG: 2.5 SOLUTION RESPIRATORY (INHALATION) at 07:43

## 2018-01-12 RX ADMIN — ALBUTEROL SULFATE SCH MG: 2.5 SOLUTION RESPIRATORY (INHALATION) at 19:01

## 2018-01-12 RX ADMIN — IPRATROPIUM BROMIDE SCH MG: 0.5 SOLUTION RESPIRATORY (INHALATION) at 19:01

## 2018-01-12 RX ADMIN — Medication SCH TAB: at 08:35

## 2018-01-12 RX ADMIN — IPRATROPIUM BROMIDE SCH MG: 0.5 SOLUTION RESPIRATORY (INHALATION) at 11:50

## 2018-01-12 RX ADMIN — IPRATROPIUM BROMIDE SCH MG: 0.5 SOLUTION RESPIRATORY (INHALATION) at 15:33

## 2018-01-12 RX ADMIN — ALBUTEROL SULFATE SCH MG: 2.5 SOLUTION RESPIRATORY (INHALATION) at 15:33

## 2018-01-12 NOTE — INTERNAL MEDICINE PROG NOTE
Internal Medicine Subjective





- Subjective


Service Date: 18


Patient is:: awake, verbal


Patient Complaints of:: congestion, cough


Per staff patient has:: tolerating meds





Internal Medicine Objective





- Results


Result Diagrams: 


 18 06:00





 18 06:00


Recent Labs: 


 Laboratory Last Values











WBC  13.3 Th/cmm (4.8-10.8)  H D 18  06:00    


 


RBC  3.54 Mil/cmm (3.80-5.20)  L  18  06:00    


 


Hgb  11.2 gm/dL (12-16)  L  18  06:00    


 


Hct  32.2 % (41.0-60)  L  18  06:00    


 


MCV  91.0 fl ()   18  06:00    


 


MCH  31.5 pg (27.0-31.0)  H  18  06:00    


 


MCHC Differential  34.6 pg (28.0-36.0)   18  06:00    


 


RDW  13.5 % (11.5-20.0)   18  06:00    


 


Plt Count  587 Th/cmm (150-400)  H  18  06:00    


 


MPV  6.8 fl  18  06:00    


 


Band Neutrophils %  2 % (0-10)   18  08:00    


 


Lymphocytes %  NP   01/10/18  05:30    


 


Monocytes %  NP   01/10/18  05:30    


 


Eosinophils %  NP   01/10/18  05:30    


 


Neutrophils (Manual)  95 % (40-80)  H  18  08:00    


 


Lymphocytes  2 % (20-50)  L  18  08:00    


 


Monocytes  1 % (2-10)  L  18  08:00    


 


Eosinophils  0 % (0-5)   18  14:00    


 


Basophils  0 % (0-3)   18  14:00    


 


Platelet Estimate  INCREASED PLATELETS  (NORMAL)   18  08:00    


 


Platelet Morphology  NORMAL  (NORMAL)   18  14:00    


 


RBC Morph Micro Appear  NORMAL  (NORMAL)   18  14:00    


 


Sodium  139 mEq/L (136-145)   18  06:00    


 


Potassium  4.1 mEq/L (3.5-5.1)   18  06:00    


 


Chloride  106 mEq/L ()   18  06:00    


 


Carbon Dioxide  28.7 mEq/L (21.0-31.0)   18  06:00    


 


Anion Gap  8.4  (7.0-16.0)   18  06:00    


 


BUN  33 mg/dL (7-25)  H  18  06:00    


 


Creatinine  0.6 mg/dL (0.6-1.2)   18  06:00    


 


Est GFR ( Amer)  TNP   18  06:00    


 


Est GFR (Non-Af Amer)  TNP   18  06:00    


 


BUN/Creatinine Ratio  55.0   18  06:00    


 


Glucose  180 mg/dL ()  H  18  06:00    


 


Hemoglobin A1c %  6.8 % (4.0-6.0)  H  01/10/18  05:30    


 


Whole Bld Lactic Acid  0.66 mmol/L (0.60-1.99)   18  14:00    


 


Calcium  9.5 mg/dL (8.6-10.3)   18  06:00    


 


Magnesium  1.9 mg/dL (1.9-2.7)   18  06:00    


 


Total Bilirubin  0.3 mg/dL (0.3-1.0)   18  06:00    


 


AST  29 U/L (13-39)   18  06:00    


 


ALT  27 U/L (7-52)   18  06:00    


 


Alkaline Phosphatase  94 U/L ()   18  06:00    


 


Ammonia  64 umol/L (16-53)  H  01/10/18  05:30    


 


Troponin I  0.02 ng/mL (0.01-0.05)   18  14:00    


 


C-Reactive Protein  39.6 mg/dL (0.0-0.9)  H  18  14:00    


 


B-Natriuretic Peptide  271.0 pg/mL (5.0-100.0)  H  01/10/18  05:30    


 


Total Protein  6.2 gm/dL (6.0-8.3)   18  06:00    


 


Albumin  2.8 gm/dL (3.7-5.3)  L  18  06:00    


 


Globulin  3.4 gm/dL  18  06:00    


 


Albumin/Globulin Ratio  0.8  (1.0-1.8)  L  18  06:00    


 


Vitamin B12  921   01/10/18  05:30    


 


Folic Acid  13.5 ng/mL (>3.0)   01/10/18  05:30    


 


TSH  0.11 uIU/ml (0.34-5.60)  L  18  05:00    


 


Vancomycin Trough  14.1 ug/mL (10-20)   18  08:00    














- Physical Exam


Vitals and I&O: 


 Vital Signs











Temp  98 F   18 12:00


 


Pulse  88   18 12:00


 


Resp  20   18 12:00


 


BP  134/75   18 12:00


 


Pulse Ox  100   18 12:00








 Intake & Output











 18





 18:59 06:59 18:59


 


Intake Total 250 300 50


 


Balance 250 300 50


 


Weight (lbs)   150 lb


 


Intake:   


 


  Intake, IV Amount 250 300 


 


    Cefepime 1 gm In Dextrose  50 





    5% 50 ml @ 100 mls/hr IV   





    Q12H [facility] Rx#:[id number]   


 


    Vancomycin HCl 1 gm In 250 250 





    Sodium Chloride 0.9% 250   





    ml @ 165 mls/hr IV Q12H   





    [facility] Rx#:[id number]   


 


  Oral   50


 


Other:   


 


  # Voids   3


 


  # Bowel Movements   1











Active Medications: 


Current Medications





Acetaminophen (Tylenol)  650 mg PO Q4H PRN


   PRN Reason: Pain Or Fever above 101


   Stop: 18 15:34


Albuterol Sulfate (Albuterol 2.5mg/3ml Neb Ud)  2.5 mg HHN Q4HRT PRN


   PRN Reason: sob


   Stop: 18 15:27


   Last Admin: 01/10/18 01:21 Dose:  2.5 mg


Albuterol Sulfate (Albuterol 2.5mg/3ml Neb Ud)  2.5 mg HHN QIDRT [facility]


   Stop: 18 18:59


   Last Admin: 18 11:50 Dose:  2.5 mg


Aspirin (Ecotrin)  81 mg PO DAILY [facility]


   Stop: 03/10/18 08:59


   Last Admin: 18 08:35 Dose:  81 mg


Citalopram Hydrobromide (Celexa)  20 mg PO DAILY [facility]


   PRN Reason: Protocol


   Stop: 03/10/18 08:59


   Last Admin: 18 08:35 Dose:  20 mg


Donepezil HCl (Aricept)  10 mg PO HS [facility]


   Stop: 18 20:59


   Last Admin: 18 22:00 Dose:  10 mg


Cefepime HCl 1 gm/ Dextrose  50 mls @ 100 mls/hr IV Q12H [facility]


   Stop: 18 15:29


   Last Admin: 18 03:15 Dose:  100 mls/hr


Dextrose/Sodium Chloride (D5-0.45ns)  1,000 mls @ 60 mls/hr IV .[id number] [facility]


   Stop: 03/10/18 13:14


   Last Admin: 01/10/18 15:31 Dose:  60 mls/hr


Vancomycin HCl 1 gm/ Sodium (Chloride)  250 mls @ 165 mls/hr IV Q12H [facility]


   Stop: 18 20:59


   Last Admin: 18 08:34 Dose:  165 mls/hr


Ipratropium Bromide (Atrovent Neb 0.5mg/2.5ml)  0.5 mg IH QIDRT [facility]


   Stop: 18 18:59


   Last Admin: 18 11:50 Dose:  0.5 mg


Memantine (Namenda)  10 mg PO DAILY [facility]


   Stop: 03/10/18 08:59


   Last Admin: 18 08:35 Dose:  10 mg


Methylprednisolone Sodium Succinate (Solu-Medrol)  80 mg IVP Q12HR [facility]


   Stop: 03/10/18 20:59


   Last Admin: 18 08:35 Dose:  80 mg


Miscellaneous (Vancomycin Iv Per Pharmacy)  1 ea MC PRN [facility]


   Stop: 18 15:44


Nitroglycerin (Nitrostat)  0.4 mg SL Q5MIN PRN


   PRN Reason: Chest Pain


   Stop: 18 15:34


Olanzapine (Zyprexa)  2.5 mg PO HS [facility]


   PRN Reason: Protocol


   Stop: 18 20:59


   Last Admin: 18 22:32 Dose:  Not Given








General: weak, alert


HEENT: NC/AT, PERRLA


Lungs: wheezing


Cardiovascular: RRR, without murmur


Abdomen: soft, non-tender, non-distended, positive bowel sound


Neurological: muscle weakness





Internal Medicine Assmt/Plan





- Assessment


Assessment: 





fever


sepsis


acute respiratory failure


pna


acute asthma exacerbation


leukocytosis


oa


osteoporosis


dementia


low magnesium


hyponatremia








- Plan


Plan: 





continue with iv solumedrol


inhalation treatments


supplemental oxygen


empiric iv antibiotics


follow up labs in am


continue current plan of care 





Nutritional Asmnt/Malnutr-PDOC





- Dietary Evaluation


Malnutrition Findings (Please click <Entered> for more info): 








Nutritional Asmnt/Malnutrition                             Start:  01/10/18 16:

20


Text:                                                      Status: Complete    

  


Freq:                                                                          

  


 Document     01/10/18 16:20  COLETTE  (Rec: 01/10/18 16:31  COLETTE  [id number])


 Nutritional Asmnt/Malnutrition


     Patient General Information


      Nutritional Screening                      High Risk


      Diagnosis                                  sepsis, fever


      Pertinent Medical Hx/Surgical Hx           OA, dementia, psychosis,


                                                 dementia


      Subjective Information                     Pt seen sleeping, on mask at


                                                 the time of visit. Spoke with


                                                 CNA, pt consumed 60% of


                                                 breakfast and lunch today, has


                                                 no teeth noted, no chewing/


                                                 swallowing problem.


      Current Diet Order/ Nutrition Support      mech soft ground


      Pertinent Medications                      D5-0.45ns, vancomycin


      Pertinent Labs                             1/10 Na 137, k 4.4, Cl 105,


                                                 BUN 22, Cr 0.7, Glucose 248,


                                                 A1c 6.8, Cl 9.3


                                                  glucose 153


     Nutritional Hx/Data


      Height                                     5 ft 4 in


      Height (Calculated Centimeters)            162.6


      Current Weight (lbs)                       150 lb


      Weight (Calculated Kilograms)              68.0


      Weight (Calculated Grams)                  57999.9


      Ideal Body Weight                          120


      % Ideal Body Weight                        125


      Body Mass Index (BMI)                      25.7


      Weight Status                              Overweight


     GI Symptoms


      GI Symptoms                                None


      Last BM                                    none


      Difficult in:                              None


      Skin Integrity/Comment:                    pressure area to buttocks,


                                                 bruise to right distal arm


      Current %PO                                Fair (50-74%)


     Estimated Nutritional Goals


      BEE in Kcals:                              Using Current wt


      Calories/Kcals/Kg                          27-32


      Kcals Calculated                           0623-9171


      Protein:                                   Using Current wt


      Protein g/k-1.2


      Protein Calculated                         68-82


      Fluid: ml                                  5016-6197


     Nutritional Problem


      2. Problem


       Problem                                   increased nutrition needs (


                                                 calorie and protein)


       Etiology                                  increased metabolic demand for


                                                 healing and increased energy


                                                 expenditure


       Signs/Symptoms:                           sepsis and fever


      1. Problem


       Problem                                   altered nutrition related lab


                                                 values


       Etiology                                  endocrine dysfunction


       Signs/Symptoms:                           Glucose 153-248, A1c 6.8


     Malnutrition Alert


      Protein-Calorie Malnutrition               N/A


      Is there a minimum of two criteria         No


       selected?                                 


       Query Text:Check all the applicable       


       criteria. A minimum of two criteria are   


       recommended for diagnosis of either       


       severe or non-severe malnutrition.        


     Intervention/Recommendation


      Comments                                   1. Continue with current diet


                                                 as ordered.


                                                 2. Monitor PO intake, wt, labs


                                                 and skin integrity


                                                 3. F/U as moderate risk in 3-5


                                                 days, -1/15, PO check 


     Expected Outcomes/Goals


      Expected Outcomes/Goals                    1. PO intake to meet at least


                                                 75% of nutritional needs.


                                                 2. Wt stability, skin to


                                                 remain intact, labs to improve